# Patient Record
Sex: FEMALE | Employment: STUDENT | URBAN - METROPOLITAN AREA
[De-identification: names, ages, dates, MRNs, and addresses within clinical notes are randomized per-mention and may not be internally consistent; named-entity substitution may affect disease eponyms.]

---

## 2019-01-09 ENCOUNTER — OFFICE VISIT (OUTPATIENT)
Dept: CARDIOLOGY CLINIC | Age: 24
End: 2019-01-09

## 2019-01-09 VITALS
SYSTOLIC BLOOD PRESSURE: 120 MMHG | WEIGHT: 147 LBS | RESPIRATION RATE: 18 BRPM | HEIGHT: 68 IN | BODY MASS INDEX: 22.28 KG/M2 | HEART RATE: 65 BPM | DIASTOLIC BLOOD PRESSURE: 76 MMHG

## 2019-01-09 DIAGNOSIS — I47.1 SVT (SUPRAVENTRICULAR TACHYCARDIA) (HCC): Primary | ICD-10-CM

## 2019-01-09 RX ORDER — METOPROLOL SUCCINATE 25 MG/1
12.5 TABLET, EXTENDED RELEASE ORAL DAILY
COMMUNITY
End: 2019-01-17

## 2019-01-09 NOTE — PROGRESS NOTES
Room # 5  New Patient - SVT    Visit Vitals  /76   Pulse 65   Resp 18   Ht 5' 8\" (1.727 m)   Wt 147 lb (66.7 kg)   BMI 22.35 kg/m²     Referral from Dr. Kayleen Kc for SVT. Stated a monitor was worn a few years ago but nothing recent. Reported dizzy spells with metoprolol 12.5mg BID. Currently taking 12.5mg qd. Limits caffeine intake to restaurants.

## 2019-01-09 NOTE — PROGRESS NOTES
HISTORY OF PRESENTING ILLNESS      Josué Barry is a 26 y.o. female referred for recommendations regarding management of SVT and palpitations. Patient has had palpitations for several years however had progression to a sustained episode that occurred in November requiring emergency room evaluation. She was given adenosine at that time which terminated her tachycardia. She was informed she had a supraventricular tachycardia. Rhythm strips from that event are not available for review today. She was not on metoprolol at the time however has been taking metoprolol since then. She has experienced lightheadedness with metoprolol 25 mg daily however is tolerant of metoprolol 12.5 mg daily. She continues to express occasional palpitations. She is also been told that she has PVCs. She does experience occasional palpitations or episodes of shortness of breath that she suspects may be related to PVCs. Her episode of SVT occurred after taking pseudoephedrine. She does report significant anxiety regarding recurrence of episodes of SVT. 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 ACTIVE PROBLEM LIST     There are no active problems to display for this patient. PAST MEDICAL HISTORY     No past medical history on file. PAST SURGICAL HISTORY     No past surgical history on file. ALLERGIES     Allergies not on file       FAMILY HISTORY     No family history on file. negative for cardiac disease       SOCIAL HISTORY            MEDICATIONS     No current outpatient medications on file. No current facility-administered medications for this visit. I have reviewed the nurses notes, vitals, problem list, allergy list, medical history, family, social history and medications. REVIEW OF SYMPTOMS      General: Pt denies excessive weight gain or loss.  Pt is able to conduct ADL's  HEENT: Denies blurred vision, headaches, hearing loss, epistaxis and difficulty swallowing. Respiratory: Denies cough, congestion, shortness of breath, DELACRUZ, wheezing or stridor. Cardiovascular: Denies precordial pain, palpitations, edema or PND  Gastrointestinal: Denies poor appetite, indigestion, abdominal pain or blood in stool  Genitourinary: Denies hematuria, dysuria, increased urinary frequency  Musculoskeletal: Denies joint pain or swelling from muscles or joints  Neurologic: Denies tremor, paresthesias, headache, or sensory motor disturbance  Psychiatric: Denies confusion, insomnia, depression  Integumentray: Denies rash, itching or ulcers. Hematologic: Denies easy bruising, bleeding       PHYSICAL EXAMINATION      There were no vitals filed for this visit. General: Well developed, in no acute distress. HEENT: No jaundice, oral mucosa moist, no oral ulcers  Neck: Supple, no stiffness, no lymphadenopathy, supple  Heart:  Normal S1/S2 negative S3 or S4. Regular, no murmur, gallop or rub, no jugular venous distention  Respiratory: Clear bilaterally x 4, no wheezing or rales  Abdomen:   Soft, non-tender, bowel sounds are active.   Extremities:  No edema, normal cap refill, no cyanosis. Musculoskeletal: No clubbing, no deformities  Neuro: A&Ox3, speech clear, gait stable, cooperative, no focal neurologic deficits  Skin: Skin color is normal. No rashes or lesions. Non diaphoretic, moist.  Vascular: 2+ pulses symmetric in all extremities       DIAGNOSTIC DATA      EKG: Sinus rhythm       LABORATORY DATA    No results found for: WBC, HGBPOC, HGB, HGBP, HCTPOC, HCT, PHCT, RBCH, PLT, MCV, HGBEXT, HCTEXT, PLTEXT   No results found for: NA, K, CL, CO2, AGAP, GLU, BUN, CREA, BUCR, GFRAA, GFRNA, CA, TBIL, TBILI, GPT, SGOT, AP, TP, ALB, GLOB, AGRAT, ALT        ASSESSMENT      1. Supraventricular tachycardia       PLAN     Discussed options of continued drug therapy versus SVT ablation. Patient will consider her options and notify us of her decision.   If she proceeds with ablation she wishes to have her ablation performed potentially prior to January 22 as she has to return to college. Would hold metoprolol 5 days prior to ablation and arrange for MAC anesthesia. Post ablation would arrange for 30-day monitor to evaluate whether her PVCs are symptomatic as well as for recurrence of SVT. Should she remain on drug therapy moving forward with continue metoprolol at 12.5 mg daily and monitor for breakthrough episodes in which case we may consider changing to an antiarrhythmic drug such as Rythmol given she has had lightheadedness with higher doses of metoprolol. Would arrange for a 30-day monitor thereafter to evaluate for symptom attic PVCs. FOLLOW-UP     Post ablation or in 3 months      Thank you, Dr. Radu Milner for allowing me to participate in the care of this extraordinarily pleasant female. Please do not hesitate to contact me for further questions/concerns.          Niesha Snyder MD  Cardiac Electrophysiology / Cardiology    [address]  [address], [facility] [address] Blanca Burgos.    [place], [address]  [phone] / [phone] Fax   [phone] / [phone] Fax

## 2019-01-10 ENCOUNTER — TELEPHONE (OUTPATIENT)
Dept: CARDIOLOGY CLINIC | Age: 24
End: 2019-01-10

## 2019-01-10 NOTE — TELEPHONE ENCOUNTER
Called patient. Left voicemail message. Notes received from Dr. Braxton Child office for SVT. Notes also mentioned patient was in ER on 10/18/18. Requesting location on ER visit to acquire records.

## 2019-01-10 NOTE — TELEPHONE ENCOUNTER
Request faxed to Medical Records for [facility] in [place]. Confirmation.

## 2019-01-10 NOTE — TELEPHONE ENCOUNTER
Pt called stating she is returning a call regarding what hospital she previous went to. Hospital name: [facility] in [place], [place].   Phone # [phone]  Thanks

## 2019-01-11 ENCOUNTER — TELEPHONE (OUTPATIENT)
Dept: CARDIOLOGY CLINIC | Age: 24
End: 2019-01-11

## 2019-01-11 RX ORDER — SODIUM CHLORIDE 0.9 % (FLUSH) 0.9 %
5-40 SYRINGE (ML) INJECTION AS NEEDED
Status: CANCELLED | OUTPATIENT
Start: 2019-01-17

## 2019-01-11 RX ORDER — SODIUM CHLORIDE 0.9 % (FLUSH) 0.9 %
5-40 SYRINGE (ML) INJECTION EVERY 8 HOURS
Status: CANCELLED | OUTPATIENT
Start: 2019-01-17

## 2019-01-11 NOTE — TELEPHONE ENCOUNTER
Pt called f/u on a procedure she needs to have scheduled. She stated she has made the decision and would like a call back.   Phone #[phone]  Thanks

## 2019-01-11 NOTE — TELEPHONE ENCOUNTER
Returned call. Patient would like to move forward with SVT Ablation. Will schedule for Thursday, January 17th, 11am arrival.  Labs drawn upon arrival.  NPO after midnight. Hold metoprolol 5 days prior to procedure. Understanding expressed.

## 2019-01-16 ENCOUNTER — TELEPHONE (OUTPATIENT)
Dept: CARDIOLOGY CLINIC | Age: 24
End: 2019-01-16

## 2019-01-16 NOTE — TELEPHONE ENCOUNTER
Returned call. Per Dr. Enedina Pineda, patient may keep nail polish on prior to ablation tomorrow. Understanding expressed.

## 2019-01-16 NOTE — TELEPHONE ENCOUNTER
Pt called to discuss if she needs to remove her nail polish prior to tomorrow procedure.   Phone # [phone]  Thanks

## 2019-01-17 ENCOUNTER — ANESTHESIA EVENT (OUTPATIENT)
Dept: SURGERY | Age: 24
End: 2019-01-17
Payer: COMMERCIAL

## 2019-01-17 ENCOUNTER — ANESTHESIA (OUTPATIENT)
Dept: SURGERY | Age: 24
End: 2019-01-17
Payer: COMMERCIAL

## 2019-01-17 ENCOUNTER — HOSPITAL ENCOUNTER (OUTPATIENT)
Dept: NON INVASIVE DIAGNOSTICS | Age: 24
Discharge: HOME | End: 2019-01-17
Attending: INTERNAL MEDICINE | Admitting: INTERNAL MEDICINE
Payer: COMMERCIAL

## 2019-01-17 LAB
ANION GAP SERPL CALC-SCNC: 11 MMOL/L (ref 5–15)
BASOPHILS # BLD: 0 K/UL (ref 0–0.1)
BASOPHILS NFR BLD: 1 % (ref 0–1)
BUN SERPL-MCNC: 9 MG/DL (ref 6–20)
BUN/CREAT SERPL: 12 (ref 12–20)
CALCIUM SERPL-MCNC: 8.9 MG/DL (ref 8.5–10.1)
CHLORIDE SERPL-SCNC: 103 MMOL/L (ref 97–108)
CO2 SERPL-SCNC: 25 MMOL/L (ref 21–32)
COMMENT, HOLDF: NORMAL
CREAT SERPL-MCNC: 0.76 MG/DL (ref 0.55–1.02)
DIFFERENTIAL METHOD BLD: ABNORMAL
EOSINOPHIL # BLD: 0.2 K/UL (ref 0–0.4)
EOSINOPHIL NFR BLD: 3 % (ref 0–7)
ERYTHROCYTE [DISTWIDTH] IN BLOOD BY AUTOMATED COUNT: 14.2 % (ref 11.5–14.5)
GLUCOSE SERPL-MCNC: 86 MG/DL (ref 65–100)
HCG SERPL QL: NEGATIVE
HCT VFR BLD AUTO: 36.6 % (ref 35–47)
HGB BLD-MCNC: 11.8 G/DL (ref 11.5–16)
IMM GRANULOCYTES # BLD AUTO: 0 K/UL (ref 0–0.04)
IMM GRANULOCYTES NFR BLD AUTO: 0 % (ref 0–0.5)
INR PPP: 1 (ref 0.9–1.1)
LYMPHOCYTES # BLD: 2.1 K/UL (ref 0.8–3.5)
LYMPHOCYTES NFR BLD: 27 % (ref 12–49)
MCH RBC QN AUTO: 25.9 PG (ref 26–34)
MCHC RBC AUTO-ENTMCNC: 32.2 G/DL (ref 30–36.5)
MCV RBC AUTO: 80.4 FL (ref 80–99)
MONOCYTES # BLD: 0.5 K/UL (ref 0–1)
MONOCYTES NFR BLD: 7 % (ref 5–13)
NEUTS SEG # BLD: 4.7 K/UL (ref 1.8–8)
NEUTS SEG NFR BLD: 62 % (ref 32–75)
NRBC # BLD: 0 K/UL (ref 0–0.01)
NRBC BLD-RTO: 0 PER 100 WBC
PLATELET # BLD AUTO: 252 K/UL (ref 150–400)
PMV BLD AUTO: 10 FL (ref 8.9–12.9)
POTASSIUM SERPL-SCNC: 3.8 MMOL/L (ref 3.5–5.1)
PROTHROMBIN TIME: 10.5 SEC (ref 9–11.1)
RBC # BLD AUTO: 4.55 M/UL (ref 3.8–5.2)
SAMPLES BEING HELD,HOLD: NORMAL
SODIUM SERPL-SCNC: 139 MMOL/L (ref 136–145)
WBC # BLD AUTO: 7.5 K/UL (ref 3.6–11)

## 2019-01-17 PROCEDURE — 77030029065 HC DRSG HEMO QCLOT ZMED -B

## 2019-01-17 PROCEDURE — 84703 CHORIONIC GONADOTROPIN ASSAY: CPT

## 2019-01-17 PROCEDURE — 85025 COMPLETE CBC W/AUTO DIFF WBC: CPT

## 2019-01-17 PROCEDURE — 77030018729 HC ELECTRD DEFIB PAD CARD -B

## 2019-01-17 PROCEDURE — 74011250636 HC RX REV CODE- 250/636

## 2019-01-17 PROCEDURE — 76060000033 HC ANESTHESIA 1 TO 1.5 HR

## 2019-01-17 PROCEDURE — C1894 INTRO/SHEATH, NON-LASER: HCPCS

## 2019-01-17 PROCEDURE — 93621 COMP EP EVL L PAC&REC C SINS: CPT

## 2019-01-17 PROCEDURE — C1893 INTRO/SHEATH, FIXED,NON-PEEL: HCPCS

## 2019-01-17 PROCEDURE — C1733 CATH, EP, OTHR THAN COOL-TIP: HCPCS

## 2019-01-17 PROCEDURE — 93653 COMPRE EP EVAL TX SVT: CPT

## 2019-01-17 PROCEDURE — 74011250636 HC RX REV CODE- 250/636: Performed by: INTERNAL MEDICINE

## 2019-01-17 PROCEDURE — 77030027107 HC PTCH EXT REF CARTO3 J&J -F

## 2019-01-17 PROCEDURE — 85610 PROTHROMBIN TIME: CPT

## 2019-01-17 PROCEDURE — 74011000250 HC RX REV CODE- 250: Performed by: INTERNAL MEDICINE

## 2019-01-17 PROCEDURE — 80048 BASIC METABOLIC PNL TOTAL CA: CPT

## 2019-01-17 PROCEDURE — 74011000258 HC RX REV CODE- 258: Performed by: INTERNAL MEDICINE

## 2019-01-17 PROCEDURE — C1732 CATH, EP, DIAG/ABL, 3D/VECT: HCPCS

## 2019-01-17 PROCEDURE — C1730 CATH, EP, 19 OR FEW ELECT: HCPCS

## 2019-01-17 PROCEDURE — 36415 COLL VENOUS BLD VENIPUNCTURE: CPT

## 2019-01-17 RX ORDER — HEPARIN SODIUM 1000 [USP'U]/ML
1000-10000 INJECTION, SOLUTION INTRAVENOUS; SUBCUTANEOUS
Status: DISCONTINUED | OUTPATIENT
Start: 2019-01-17 | End: 2019-01-17 | Stop reason: HOSPADM

## 2019-01-17 RX ORDER — ACETAMINOPHEN 325 MG/1
650 TABLET ORAL
Status: DISCONTINUED | OUTPATIENT
Start: 2019-01-17 | End: 2019-01-17 | Stop reason: HOSPADM

## 2019-01-17 RX ORDER — SODIUM CHLORIDE 0.9 % (FLUSH) 0.9 %
5-40 SYRINGE (ML) INJECTION EVERY 8 HOURS
Status: DISCONTINUED | OUTPATIENT
Start: 2019-01-17 | End: 2019-01-17 | Stop reason: HOSPADM

## 2019-01-17 RX ORDER — HEPARIN SODIUM 200 [USP'U]/100ML
500 INJECTION, SOLUTION INTRAVENOUS ONCE
Status: COMPLETED | OUTPATIENT
Start: 2019-01-17 | End: 2019-01-17

## 2019-01-17 RX ORDER — ONDANSETRON 2 MG/ML
4 INJECTION INTRAMUSCULAR; INTRAVENOUS
Status: DISCONTINUED | OUTPATIENT
Start: 2019-01-17 | End: 2019-01-17 | Stop reason: HOSPADM

## 2019-01-17 RX ORDER — SODIUM CHLORIDE 0.9 % (FLUSH) 0.9 %
5-40 SYRINGE (ML) INJECTION AS NEEDED
Status: DISCONTINUED | OUTPATIENT
Start: 2019-01-17 | End: 2019-01-17 | Stop reason: HOSPADM

## 2019-01-17 RX ORDER — ATROPINE SULFATE 0.1 MG/ML
0.5 INJECTION INTRAVENOUS
Status: DISCONTINUED | OUTPATIENT
Start: 2019-01-17 | End: 2019-01-17 | Stop reason: HOSPADM

## 2019-01-17 RX ORDER — HEPARIN SODIUM 200 [USP'U]/100ML
500 INJECTION, SOLUTION INTRAVENOUS ONCE
Status: DISCONTINUED | OUTPATIENT
Start: 2019-01-17 | End: 2019-01-17 | Stop reason: HOSPADM

## 2019-01-17 RX ORDER — HYDROCODONE BITARTRATE AND ACETAMINOPHEN 5; 325 MG/1; MG/1
1 TABLET ORAL
Status: DISCONTINUED | OUTPATIENT
Start: 2019-01-17 | End: 2019-01-17 | Stop reason: HOSPADM

## 2019-01-17 RX ORDER — PROPOFOL 10 MG/ML
INJECTION, EMULSION INTRAVENOUS
Status: DISCONTINUED | OUTPATIENT
Start: 2019-01-17 | End: 2019-01-18 | Stop reason: HOSPADM

## 2019-01-17 RX ORDER — SODIUM CHLORIDE 9 MG/ML
INJECTION, SOLUTION INTRAVENOUS
Status: DISCONTINUED | OUTPATIENT
Start: 2019-01-17 | End: 2019-01-18 | Stop reason: HOSPADM

## 2019-01-17 RX ORDER — LIDOCAINE HYDROCHLORIDE 10 MG/ML
10-30 INJECTION INFILTRATION; PERINEURAL
Status: DISCONTINUED | OUTPATIENT
Start: 2019-01-17 | End: 2019-01-17 | Stop reason: HOSPADM

## 2019-01-17 RX ORDER — PROPOFOL 10 MG/ML
INJECTION, EMULSION INTRAVENOUS AS NEEDED
Status: DISCONTINUED | OUTPATIENT
Start: 2019-01-17 | End: 2019-01-18 | Stop reason: HOSPADM

## 2019-01-17 RX ADMIN — PROPOFOL 80 MG: 10 INJECTION, EMULSION INTRAVENOUS at 15:19

## 2019-01-17 RX ADMIN — PROPOFOL 60 MG: 10 INJECTION, EMULSION INTRAVENOUS at 15:36

## 2019-01-17 RX ADMIN — PROPOFOL 40 MG: 10 INJECTION, EMULSION INTRAVENOUS at 15:28

## 2019-01-17 RX ADMIN — HEPARIN SODIUM 1000 UNITS: 200 INJECTION, SOLUTION INTRAVENOUS at 15:32

## 2019-01-17 RX ADMIN — PROPOFOL 40 MG: 10 INJECTION, EMULSION INTRAVENOUS at 15:25

## 2019-01-17 RX ADMIN — PROPOFOL 40 MG: 10 INJECTION, EMULSION INTRAVENOUS at 15:23

## 2019-01-17 RX ADMIN — ISOPROTERENOL HYDROCHLORIDE 2 MCG/MIN: 0.2 INJECTION, SOLUTION INTRAMUSCULAR; INTRAVENOUS at 16:05

## 2019-01-17 RX ADMIN — SODIUM CHLORIDE: 9 INJECTION, SOLUTION INTRAVENOUS at 15:05

## 2019-01-17 RX ADMIN — PROPOFOL 40 MG: 10 INJECTION, EMULSION INTRAVENOUS at 15:21

## 2019-01-17 RX ADMIN — LIDOCAINE HYDROCHLORIDE 20 ML: 10 INJECTION, SOLUTION INFILTRATION; PERINEURAL at 15:32

## 2019-01-17 RX ADMIN — PROPOFOL 100 MCG/KG/MIN: 10 INJECTION, EMULSION INTRAVENOUS at 15:27

## 2019-01-17 RX ADMIN — SODIUM CHLORIDE: 9 INJECTION, SOLUTION INTRAVENOUS at 16:25

## 2019-01-17 RX ADMIN — PROPOFOL 40 MG: 10 INJECTION, EMULSION INTRAVENOUS at 15:55

## 2019-01-17 NOTE — ANESTHESIA PREPROCEDURE EVALUATION
Anesthetic History No history of anesthetic complications Review of Systems / Medical History Patient summary reviewed, nursing notes reviewed and pertinent labs reviewed Pulmonary Within defined limits Neuro/Psych Within defined limits Cardiovascular Dysrhythmias : SVT and PVC Comments: Chronic palpitations, h/o SVT  
GI/Hepatic/Renal 
Within defined limits Endo/Other Within defined limits Other Findings Physical Exam 
 
Airway Mallampati: II 
 
Neck ROM: normal range of motion Cardiovascular Rhythm: regular Rate: normal 
 
 
 
 Dental 
No notable dental hx Pulmonary Breath sounds clear to auscultation Abdominal 
GI exam deferred Other Findings Anesthetic Plan ASA: 2 Anesthesia type: MAC Induction: Intravenous Anesthetic plan and risks discussed with: Patient

## 2019-01-17 NOTE — PROGRESS NOTES
Patient arrived. ID and allergies verified verbally with patient. Pt voices understanding of procedure to be performed. Consent obtained. Pt prepped for procedure. Labs sent. 16:30  TRANSFER - IN REPORT: 
 
Verbal report received from RUBEN Flores(name) on Caresse Carter  being received from EP(unit) for routine progression of care Report consisted of patients Situation, Background, Assessment and  
Recommendations(SBAR). Information from the following report(s) Procedure Summary was reviewed with the receiving nurse. Opportunity for questions and clarification was provided. Assessment completed upon patients arrival to unit and care assumed. 19:30,Discharge instructions reviewed with patient and family. Voiced understanding. Patient given copy of discharge instructions to take home. Pt taken out of unit in [facility] warm,pale and nauseated,pt brought back to unit,placed on stretcher,BP 92/71. After heaving,pt drank some water and state1+lku d she felt better. BP is 118/79. Rt groin stable. 19:50,pt DC via [facility] home with Mother.

## 2019-01-17 NOTE — DISCHARGE INSTRUCTIONS
Electrophysiology Study (EPS)/Cardiac Ablation   Discharge Instructions      You have just had a Cardiac Ablation for supraventricular tachycardia (specifically \"AVNRT\" - atrioventricular thee reentrant tachycardia). There were catheters temporarily placed in your heart through a puncture in the Veins and/or Arteries in your groin. WHAT TO EXPECT      If you have had a Cardiac Ablation please be aware that you may experience mild chest pain that will resolve within 24 hours. If the Chest Pain begins radiating down your shoulders or arms, becomes severe or breathing becomes difficult, call 911 or go to the closest emergency room.  Mild to moderate, non-painful, bruising at the puncture site is not un-common, and will resolve in 7 - 10 days.  If a closure device was used to seal your artery or vein, a separate pamphlet will be given to you with these discharge instructions. It is very important that you review the information in the pamphlet for different restrictions and precautions.  You have a small gauze dressing applied to the puncture site in your groin. You may remove this the following morning. MEDICATIONS      Take only the medications prescribed to you at discharge. ACTIVITY      A responsible adult must take you home. Do not drive a car for 24 hours.  Rest quietly for the remainder of the day.  Do not lift anything greater than 10 pounds for 3 days.  Limit bending at the puncture site and use of stairs for at least 3 days.  You may remove the bandage and shower the morning after the procedure. Do not take a bath for 3 days. SYMPTOMS THAT NEED TO BE REPORTED IMMEDIATELY      Bleeding at the puncture site. If there is bleeding, lie down and hold firm direct pressure for at least 5 minutes. If the bleeding does not stop, go to the closest emergency room, or call 911.      Temperature more than 100.5 F.   Redness or warmth at the puncture site.   Increasing pain, numbness, coolness or blue discoloration of the extremity where the puncture is located.  Pulsating mass at the puncture site.  A new lump at the puncture site, or increasing swelling at the site.  Bruising at the puncture site that enlarges or becomes painful (some bruising at the site is common and will go away in 7 - 10 days).  Rapid heart rate or palpitations.  Dizziness, lightheadedness, fainting.  REMEMBER: If you feel something is an emergency or cannot be handled over the phone, call 911 or go to the closest emergency room.       Edmond Dowdy Dr. Antonette Gottron in 1 month        Sandra Carrington MD  Cardiac Electrophysiology / Cardiology    [address]  [address] [place] [address]  [phone] / [phone] Fax   [phone] / [phone] Fax

## 2019-01-17 NOTE — H&P
HISTORY OF PRESENTING ILLNESS Timmy Morales is a 26 y.o. female referred for recommendations regarding management of SVT and palpitations. Patient has had palpitations for several years however had progression to a sustained episode that occurred in November requiring emergency room evaluation. She was given adenosine at that time which terminated her tachycardia. She was informed she had a supraventricular tachycardia. Rhythm strips from that event are not available for review today. She was not on metoprolol at the time however has been taking metoprolol since then. She has experienced lightheadedness with metoprolol 25 mg daily however is tolerant of metoprolol 12.5 mg daily. She continues to express occasional palpitations. She is also been told that she has PVCs. She does experience occasional palpitations or episodes of shortness of breath that she suspects may be related to PVCs. Her episode of SVT occurred after taking pseudoephedrine. She does report significant anxiety regarding recurrence of episodes of SVT. 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 
  
  
 ACTIVE PROBLEM LIST  
  
There are no active problems to display for this patient. 
  
  
  
 PAST MEDICAL HISTORY  
  
No past medical history on file.  
  
  
 PAST SURGICAL HISTORY  
  
No past surgical history on file. 
  
  
 ALLERGIES  
  
Allergies not on file  
  
  
FAMILY HISTORY  
  
No family history on file. negative for cardiac disease 
  
  
 SOCIAL HISTORY  
  
  
  
  
  
MEDICATIONS  
  
No current outpatient medications on file.  
  
No current facility-administered medications for this visit.   
  
  
I have reviewed the nurses notes, vitals, problem list, allergy list, medical history, family, social history and medications. 
  
  
 REVIEW OF SYMPTOMS General: Pt denies excessive weight gain or loss.  Pt is able to conduct ADL's 
 HEENT: Denies blurred vision, headaches, hearing loss, epistaxis and difficulty swallowing. Respiratory: Denies cough, congestion, shortness of breath, DELACRUZ, wheezing or stridor. Cardiovascular: Denies precordial pain, palpitations, edema or PND Gastrointestinal: Denies poor appetite, indigestion, abdominal pain or blood in stool Genitourinary: Denies hematuria, dysuria, increased urinary frequency Musculoskeletal: Denies joint pain or swelling from muscles or joints Neurologic: Denies tremor, paresthesias, headache, or sensory motor disturbance Psychiatric: Denies confusion, insomnia, depression Integumentray: Denies rash, itching or ulcers. Hematologic: Denies easy bruising, bleeding 
  
  
 PHYSICAL EXAMINATION There were no vitals filed for this visit. General: Well developed, in no acute distress. HEENT: No jaundice, oral mucosa moist, no oral ulcers Neck: Supple, no stiffness, no lymphadenopathy, supple Heart:  Normal S1/S2 negative S3 or S4. Regular, no murmur, gallop or rub, no jugular venous distention Respiratory: Clear bilaterally x 4, no wheezing or rales Abdomen:   Soft, non-tender, bowel sounds are active.  
Extremities:  No edema, normal cap refill, no cyanosis. Musculoskeletal: No clubbing, no deformities Neuro: A&Ox3, speech clear, gait stable, cooperative, no focal neurologic deficits Skin: Skin color is normal. No rashes or lesions. Non diaphoretic, moist. 
Vascular: 2+ pulses symmetric in all extremities 
  
  
 DIAGNOSTIC DATA EKG: Sinus rhythm 
  
  
 LABORATORY DATA No results found for: WBC, HGBPOC, HGB, HGBP, HCTPOC, HCT, PHCT, RBCH, PLT, MCV, HGBEXT, HCTEXT, PLTEXT No results found for: NA, K, CL, CO2, AGAP, GLU, BUN, CREA, BUCR, GFRAA, GFRNA, CA, TBIL, TBILI, GPT, SGOT, AP, TP, ALB, GLOB, AGRAT, ALT  
  
  
 ASSESSMENT 1. Supraventricular tachycardia 
  
  
 PLAN  
  
SVT ablation.  Post ablation will arrange for 30-day monitor to evaluate whether her PVCs are symptomatic as well as for recurrence of SVT.   
  
  
Bharti Mullen MD 
Cardiac Electrophysiology / Cardiology 
  
[address] [facility] [address] [facility], [address] 
[phone] / [phone] Fax                                    [phone] / [phone] Fax

## 2019-01-17 NOTE — PROCEDURES
Cardiac Electrophysiology Report      PATIENT INFORMATION      Patient Name: Олег Stone MRN: [id number]                             Study Date: 2019    YOB: 1995   Age: 26 y.o. Gender: female      Procedure:  Electrophysiology Study +/- Cardiac Ablation    Referring Physician:  Dion Pena MD          STAFF     Duty Name   Electrophysiologist Mayela Manuel MD   Monitor Anesthesia service   Circulator Brady Oneil RN; Braulio Peterson RN; Cl Vo RN       PATIENT HISTORY     Tiara Morris a 23 y.o. female with SVT and palpitations. Patient has had palpitations for several years however had progression to a sustained episode that occurred in November requiring emergency room evaluation.  She was given adenosine at that time which terminated her tachycardia.  She was informed she had a supraventricular tachycardia. She was not on metoprolol at the time however has been taking metoprolol since then. Constanza Hackett has experienced lightheadedness with metoprolol 25 mg daily however was tolerant of metoprolol 12.5 mg daily.  She continues to express occasional palpitations. Constanza Hackett is also had been told that she had PVCs.  She experienced occasional palpitations or episodes of shortness of breath that she suspected may be related to PVCs.  Her episode of SVT occurred after taking pseudoephedrine.  She does report significant anxiety regarding recurrence of episodes of SVT. She now presents for SVT ablation. PROCEDURE     The patient was brought to the Cardiac Electrophysiology laboratory in a post-absorptive, fasting state. Informed consent was obtained. A peripheral IV was in place. Continuous electrocardiographic, blood pressure, O2 saturation and  CO2 monitoring was initiated. Self-adhesive cardioversion patches were positioned on the chest.  Conscious sedation was administered per protocol.   The patient was then prepped and draped in the usual sterile fashion. Both groins were infiltrated with a 50/50 mixture of Lidocaine (1%) and bupivicaine (0.5%). An 8F,  7F and two 6F sheaths were placed in the right femoral vein. Through these sheaths, 6F quadrapolar catheters were placed in the high right atrium, right ventricle and His positions, and a 7F octapolar catheter was placed in the coronary sinus for left atrial pacing/recording. Programmed and incremental stimulation was performed from the RA, RV apex and RV outflow tract at twice the diastolic threshold. Once the tachycardia was induced, various maneuvers were performed to determine its mechanism. These included atrial extrastimuli, ventricular extrastimuli, atrial burst pacing, ventricular burst pacing. Studies were performed at baseline and on cardio-active drugs. The chambers of interest were mapped to identify the abnormal tissue. A 7F, 4mm ablation catheter was positioned adjacent to the abnormal tissue and RF energy delivered until the abnormal tissue was eliminated. At the conclusion of the procedure, the catheters and sheaths were removed and hemostasis obtained by direct compression. The patient was hemodynamically stable, tolerated the procedure well and was transferred in stable condition. There was minimal blood loss and no specimen were removed. CONDUCTION INTERVALS     V-V  673 P-R  140 QRS  82 Q-T  382 A-H  83 H-V  40         FINDINGS     1. The baseline ECG revealed sinus rhythm without ventricular preexcitation with frequent paroxysms of narrow complex tachycardia with a  msec. 2. The intracardiac intervals were normal (HV= 40 msec). 3. AV thee function curves were attempted however tachycardia induced. 4. Retrograde conduction was concentric and decremental.   5. The narrow complex, short RP tachycardia  (VA =  10 ms) had a TCL of 380 ms. Ventricular overdrive pacing was performed and upon cessation a VAV response was exhibited.  The PPI-TCL was >115 ms the VAtach-VAstim was > 85 ms. 6. 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 7. A 4 mm, bi-directional, non-irrigated tip ablation catheter was then advanced through an SR0 sheath and positioned anterior and inferior to the coronary sinus ostium. Once adequate electrograms were identified, several applications of radiofrequency energy were delivered with resultant junctional beats evident during ablation. 8. Delivery of RF energy rendered the tachycardia no longer inducible on/off isoproteronol. RADIOLOGY SUMMARY      Total   Fluoro Time (minutes) 10.9   Dose Area Product (mGy) 122       CONCLUSIONS     1. 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 Discontinue metoprolol  3. Plan for 30 day monitor to determine whether patient is also symptomatic from PVCs. 4. Follow up in 1 month in EP clinic. 5. Follow up with Dony Cosme MD as scheduled. Thank you, Dony Cosme MD for allowing me to participate in the care of this extraordinarily pleasant female.          Maggie Calderón MD  Cardiac Electrophysiology / Cardiology    [place] [address].  [address] [facility], [address] [address], [address]  [address]  [phone] / [phone] Fax    [phone] / [phone] Fax

## 2019-01-17 NOTE — PROGRESS NOTES
1500 TRANSFER - OUT REPORT: 
 
Verbal report given to abel(name) on Alex Carter  being transferred to  (unit) for routine progression of care Report consisted of patients Situation, Background, Assessment and  
Recommendations(SBAR). Information from the following report(s) Procedure Summary was reviewed with the receiving nurse. Lines:  
Peripheral IV 01/17/19 Right Antecubital (Active) Peripheral IV 01/17/19 Anterior;Distal;Left Forearm (Active) Opportunity for questions and clarification was provided. Patient transported with: 
 Registered Nurse

## 2019-01-18 ENCOUNTER — CLINICAL SUPPORT (OUTPATIENT)
Dept: CARDIOLOGY CLINIC | Age: 24
End: 2019-01-18

## 2019-01-18 VITALS
RESPIRATION RATE: 17 BRPM | BODY MASS INDEX: 22.12 KG/M2 | SYSTOLIC BLOOD PRESSURE: 117 MMHG | HEIGHT: 68 IN | HEART RATE: 81 BPM | TEMPERATURE: 98.1 F | WEIGHT: 145.94 LBS | DIASTOLIC BLOOD PRESSURE: 77 MMHG | OXYGEN SATURATION: 97 %

## 2019-01-18 DIAGNOSIS — I47.1 AVNRT (AV NODAL RE-ENTRY TACHYCARDIA) (HCC): Primary | ICD-10-CM

## 2019-01-18 DIAGNOSIS — I49.3 PVC (PREMATURE VENTRICULAR CONTRACTION): ICD-10-CM

## 2019-01-19 NOTE — ANESTHESIA POSTPROCEDURE EVALUATION
* No procedures listed *. Anesthesia Post Evaluation Comments: Patient left PACU before being reviewed by anestehsiologist. 
 
 
 
Visit Vitals /77 Pulse 81 Temp 36.7 °C (98.1 °F) Resp 17 Ht 5' 8\" (1.727 m) Wt 66.2 kg (145 lb 15.1 oz) SpO2 97% Breastfeeding? No  
BMI 22.19 kg/m²

## 2019-01-29 ENCOUNTER — TELEPHONE (OUTPATIENT)
Dept: CARDIOLOGY CLINIC | Age: 24
End: 2019-01-29

## 2019-01-29 NOTE — TELEPHONE ENCOUNTER
Pt called to f/u on the 30 day heart monitor. She asked what does she have to do as far as receiving it.   Phone #[phone]  Thanks

## 2019-01-31 NOTE — TELEPHONE ENCOUNTER
L/m to inform her that the [employer] tracking # shows loop was delivered on 1/24 3:26 front door. Gave pt ph# for [employer].

## 2019-03-08 ENCOUNTER — TELEPHONE (OUTPATIENT)
Dept: CARDIOLOGY CLINIC | Age: 24
End: 2019-03-08

## 2019-03-08 NOTE — TELEPHONE ENCOUNTER
Patient states she just finished her 30 days with heart monitor and is wondering if she needs to schedule a follow up or is the 4/15/19 appointment sufficient.        Phone: [phone]

## 2019-03-29 ENCOUNTER — OFFICE VISIT (OUTPATIENT)
Dept: CARDIOLOGY CLINIC | Age: 24
End: 2019-03-29

## 2019-03-29 VITALS
OXYGEN SATURATION: 98 % | WEIGHT: 152.8 LBS | HEART RATE: 76 BPM | DIASTOLIC BLOOD PRESSURE: 62 MMHG | RESPIRATION RATE: 14 BRPM | SYSTOLIC BLOOD PRESSURE: 104 MMHG | BODY MASS INDEX: 23.16 KG/M2 | HEIGHT: 68 IN

## 2019-03-29 DIAGNOSIS — I47.1 AVNRT (AV NODAL RE-ENTRY TACHYCARDIA) (HCC): Primary | ICD-10-CM

## 2019-03-29 RX ORDER — DROSPIRENONE AND ETHINYL ESTRADIOL 0.03MG-3MG
KIT ORAL DAILY
COMMUNITY

## 2019-03-29 NOTE — PROGRESS NOTES
HISTORY OF PRESENTING ILLNESS      Sarah Ray is a 26 y.o. 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 Post ablation metoprolol was discontinued and a 30-day monitor was arranged to determine whether patient was also symptomatic from PVCs. She now presents for follow-up. 30 monitor demonstrated PVCs during which patient reported palpitations. She was also noticed to have several episodes of long RP tachycardias with heart rates into the 150s-160s however these were asymptomatic. Patient reports that she is no longer experiencing episodes consistent with her previous clinical symptoms. Her PVCs are symptomatic though are not bothersome and are not impeding her quality of life at this time. ACTIVE PROBLEM LIST     There are no active problems to display for this patient. PAST MEDICAL HISTORY     No past medical history on file. PAST SURGICAL HISTORY     No past surgical history on file. ALLERGIES     Allergies   Allergen Reactions    Sulfa (Sulfonamide Antibiotics) Itching          FAMILY HISTORY     No family history on file. negative for cardiac disease       SOCIAL HISTORY     Social History     Socioeconomic History    Marital status: SINGLE     Spouse name: Not on file    Number of children: Not on file    Years of education: Not on file    Highest education level: Not on file   Tobacco Use    Smoking status: Never Smoker    Smokeless tobacco: Never Used         MEDICATIONS     Current Outpatient Medications   Medication Sig    OTHER      No current facility-administered medications for this visit. I have reviewed the nurses notes, vitals, problem list, allergy list, medical history, family, social history and medications. REVIEW OF SYMPTOMS      General: Pt denies excessive weight gain or loss.  Pt is able to conduct ADL's  HEENT: Denies blurred vision, headaches, hearing loss, epistaxis and difficulty swallowing. Respiratory: Denies cough, congestion, shortness of breath, DELACRUZ, wheezing or stridor. Cardiovascular: Denies precordial pain, palpitations, edema or PND  Gastrointestinal: Denies poor appetite, indigestion, abdominal pain or blood in stool  Genitourinary: Denies hematuria, dysuria, increased urinary frequency  Musculoskeletal: Denies joint pain or swelling from muscles or joints  Neurologic: Denies tremor, paresthesias, headache, or sensory motor disturbance  Psychiatric: Denies confusion, insomnia, depression  Integumentray: Denies rash, itching or ulcers. Hematologic: Denies easy bruising, bleeding       PHYSICAL EXAMINATION      There were no vitals filed for this visit. General: Well developed, in no acute distress. HEENT: No jaundice, oral mucosa moist, no oral ulcers  Neck: Supple, no stiffness, no lymphadenopathy, supple  Heart:  Normal S1/S2 negative S3 or S4. Regular, no murmur, gallop or rub, no jugular venous distention  Respiratory: Clear bilaterally x 4, no wheezing or rales  Abdomen:   Soft, non-tender, bowel sounds are active.   Extremities:  No edema, normal cap refill, no cyanosis. Musculoskeletal: No clubbing, no deformities  Neuro: A&Ox3, speech clear, gait stable, cooperative, no focal neurologic deficits  Skin: Skin color is normal. No rashes or lesions.  Non diaphoretic, moist.  Vascular: 2+ pulses symmetric in all extremities       DIAGNOSTIC DATA      EKG:        LABORATORY DATA      Lab Results   Component Value Date/Time    WBC 7.5 01/17/2019 01:26 PM    HGB 11.8 01/17/2019 01:26 PM    HCT 36.6 01/17/2019 01:26 PM    PLATELET 764 98/12/3000 01:26 PM    MCV 80.4 01/17/2019 01:26 PM      Lab Results   Component Value Date/Time    Sodium 139 01/17/2019 01:26 PM    Potassium 3.8 01/17/2019 01:26 PM    Chloride 103 01/17/2019 01:26 PM    CO2 25 01/17/2019 01:26 PM    Anion gap 11 01/17/2019 01:26 PM Glucose 86 01/17/2019 01:26 PM    BUN 9 01/17/2019 01:26 PM    Creatinine 0.76 01/17/2019 01:26 PM    BUN/Creatinine ratio 12 01/17/2019 01:26 PM    GFR est AA >60 01/17/2019 01:26 PM    GFR est non-AA >60 01/17/2019 01:26 PM    Calcium 8.9 01/17/2019 01:26 PM           ASSESSMENT      1. Supraventricular tachycardia   A. Typical AVNRT ablation 1/17/2019  2. Premature ventricular contractions         PLAN     Continue monitoring for progression of PVCs as well as onset of symptoms with her long RP tachycardia to guide future recommendations. FOLLOW-UP     1 year      Thank you, Brennan Hernandez MD for allowing me to participate in the care of this extraordinarily pleasant female. Please do not hesitate to contact me for further questions/concerns.          Esther Ashford MD  Cardiac Electrophysiology / Cardiology    [address] [place] [address]  [phone] / [phone] Fax   [phone] / [phone] Fax

## 2019-03-29 NOTE — PROGRESS NOTES
Room # 7    Palpitations but less than before, One episode of elevated HR of 108 felt a little SOB and uncomfortable feeling in her chest     Visit Vitals  /62 (BP 1 Location: Left arm, BP Patient Position: Sitting)   Pulse 76   Resp 14   Ht 5' 8\" (1.727 m)   Wt 152 lb 12.8 oz (69.3 kg)   SpO2 98%   BMI 23.23 kg/m²

## 2020-04-09 ENCOUNTER — TELEPHONE (OUTPATIENT)
Dept: CARDIOLOGY CLINIC | Age: 25
End: 2020-04-09

## 2020-04-15 ENCOUNTER — TELEPHONE (OUTPATIENT)
Dept: CARDIOLOGY CLINIC | Age: 25
End: 2020-04-15

## 2020-04-17 ENCOUNTER — VIRTUAL VISIT (OUTPATIENT)
Dept: CARDIOLOGY CLINIC | Age: 25
End: 2020-04-17

## 2020-04-17 ENCOUNTER — TELEPHONE (OUTPATIENT)
Dept: CARDIOLOGY CLINIC | Age: 25
End: 2020-04-17

## 2020-04-17 DIAGNOSIS — I47.1 SVT (SUPRAVENTRICULAR TACHYCARDIA) (HCC): ICD-10-CM

## 2020-04-17 DIAGNOSIS — Z98.890 S/P ABLATION OF ACCESSORY BYPASS TRACT: ICD-10-CM

## 2020-04-17 DIAGNOSIS — I49.3 PVC (PREMATURE VENTRICULAR CONTRACTION): ICD-10-CM

## 2020-04-17 DIAGNOSIS — I47.1 AVNRT (AV NODAL RE-ENTRY TACHYCARDIA) (HCC): Primary | ICD-10-CM

## 2020-04-17 RX ORDER — SERTRALINE HYDROCHLORIDE 100 MG/1
100 TABLET, FILM COATED ORAL DAILY
COMMUNITY

## 2020-04-17 NOTE — PROGRESS NOTES
VIRTUAL VISIT DOCUMENTATION     Pursuant to the emergency declaration under the [facility] waiver authority and the [employer] Act, this Virtual  Visit was conducted, with patient's consent, to reduce the patient's risk of exposure to COVID-19 and provide continuity of care for an established patient. Services were provided through a video synchronous discussion virtually to substitute for in-person clinic visit. HISTORY OF PRESENTING ILLNESS      Theo Gary is a 27 y.o. female who was seen by synchronous (real-time) audio-video technology on 4/17/2020. 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 Post ablation metoprolol was discontinued and a 30-day monitor which demonstrated PVCs during which patient reported palpitations. She was also noticed to have several episodes of long RP tachycardias with heart rates into the 150s-160s however these were asymptomatic. Patient reports that she is no longer experiencing episodes consistent with her previous clinical symptoms. She is doing well and continues to have occasional PVCs, most notably around the time of her menstrual cycle. These palpitations are noticable, but not impeding quality of life. She reports adequate hydration, ability to exercise without issue. ACTIVE PROBLEM LIST     There are no active problems to display for this patient. PAST MEDICAL HISTORY     No past medical history on file. PAST SURGICAL HISTORY     Past Surgical History:   Procedure Laterality Date    HX SVT ABLATION  01/17/2019          ALLERGIES     Allergies   Allergen Reactions    Sulfa (Sulfonamide Antibiotics) Itching          FAMILY HISTORY     No family history on file.  negative for cardiac disease       SOCIAL HISTORY     Social History     Socioeconomic History    Marital status: SINGLE     Spouse name: Not on file    Number of children: Not on file    Years of education: Not on file    Highest education level: Not on file   Tobacco Use    Smoking status: Never Smoker    Smokeless tobacco: Never Used   Substance and Sexual Activity    Alcohol use: Yes     Alcohol/week: 1.0 standard drinks     Types: 1 Glasses of wine per week         MEDICATIONS     Current Outpatient Medications   Medication Sig    sertraline (ZOLOFT) 50 mg tablet Take  by mouth daily.  drospirenone-ethinyl estradiol (JUNIOR) 3-0.03 mg tab Take  by mouth daily. No current facility-administered medications for this visit. I have reviewed the nurses notes, vitals, problem list, allergy list, medical history, family, social history and medications. REVIEW OF SYMPTOMS      General: Pt denies excessive weight gain or loss. Pt is able to conduct ADL's  HEENT: Denies blurred vision, headaches, hearing loss, epistaxis and difficulty swallowing. Respiratory: Denies cough, congestion, shortness of breath, DELARCUZ, wheezing or stridor. Cardiovascular: Denies precordial pain, palpitations, edema or PND  Gastrointestinal: Denies poor appetite, indigestion, abdominal pain or blood in stool  Genitourinary: Denies hematuria, dysuria, increased urinary frequency  Musculoskeletal: Denies joint pain or swelling from muscles or joints  Neurologic: Denies tremor, paresthesias, headache, or sensory motor disturbance  Psychiatric: Denies confusion, insomnia, depression  Integumentray: Denies rash, itching or ulcers. Hematologic: Denies easy bruising, bleeding       PHYSICAL EXAMINATION      Due to this being a TeleHealth evaluation, many elements of the physical examination are unable to be assessed. General: Well developed, in no acute distress, cooperative and alert  HEENT: Pupils equal/round. No marked JVD visible on video.   Respiratory: No audible wheezing, no signs of respiratory distress, lips non cyanotic  Extremities:  No edema  Neuro: A&Ox3, speech clear, no facial droop, answering questions appropriately  Skin: Skin color is normal. No rashes or lesions. Non diaphoretic on visible skin during exam       DIAGNOSTIC DATA      EKG:        LABORATORY DATA      Lab Results   Component Value Date/Time    WBC 7.5 01/17/2019 01:26 PM    HGB 11.8 01/17/2019 01:26 PM    HCT 36.6 01/17/2019 01:26 PM    PLATELET 453 19/13/3379 01:26 PM    MCV 80.4 01/17/2019 01:26 PM      Lab Results   Component Value Date/Time    Sodium 139 01/17/2019 01:26 PM    Potassium 3.8 01/17/2019 01:26 PM    Chloride 103 01/17/2019 01:26 PM    CO2 25 01/17/2019 01:26 PM    Anion gap 11 01/17/2019 01:26 PM    Glucose 86 01/17/2019 01:26 PM    BUN 9 01/17/2019 01:26 PM    Creatinine 0.76 01/17/2019 01:26 PM    BUN/Creatinine ratio 12 01/17/2019 01:26 PM    GFR est AA >60 01/17/2019 01:26 PM    GFR est non-AA >60 01/17/2019 01:26 PM    Calcium 8.9 01/17/2019 01:26 PM           ASSESSMENT      1. Supraventricular tachycardia              A.  Typical AVNRT ablation 1/17/2019  2. Premature ventricular contractions           ICD-10-CM ICD-9-CM    1. AVNRT (AV thee re-entry tachycardia) ([facility]) I47.1 427.89    2. PVC (premature ventricular contraction) I49.3 427.69    3. SVT (supraventricular tachycardia) ([facility]) I47.1 427.89    4. S/P ablation of accessory bypass tract Z98.890 V45.89      No orders of the defined types were placed in this encounter. PLAN   Continue to monitor for progression of symptoms in which case would consider repeat monitoring to evaluate PVC burden and presence of tachycardia. We discussed the expected course, resolution and complications of the diagnosis(es) in detail. Medication risks, benefits, costs, interactions, and alternatives were discussed as indicated. I advised her to contact the office if her condition worsens, changes or fails to improve as anticipated.  She expressed understanding with the diagnosis(es) and plan     FOLLOW-UP   1 year    Patient was made aware and verbalized understanding that an appointment will be scheduled for them for a virtual visit and/or office visit within the above time frame. Patient understanding his/her responsibility to call and change time/date if he/she so chooses. Thank you, Vin Bateman MD for allowing me to participate in the care of this extraordinarily pleasant female. Please do not hesitate to contact me for further questions/concerns. Lali Valenzuela, MAGDY      [address] [place] [address], [facility]. Blanca Burgos.    Stefani Harmon  [phone] / [phone] Fax   [phone] / [phone] Fax        Greater than 25 minutes was spent in direct video patient care, planning and chart review. This visit was conducted using [employer] telemedicine services.

## 2021-04-22 ENCOUNTER — OFFICE VISIT (OUTPATIENT)
Dept: CARDIOLOGY CLINIC | Age: 26
End: 2021-04-22
Payer: COMMERCIAL

## 2021-04-22 VITALS
HEART RATE: 82 BPM | DIASTOLIC BLOOD PRESSURE: 80 MMHG | WEIGHT: 139 LBS | HEIGHT: 68 IN | SYSTOLIC BLOOD PRESSURE: 122 MMHG | OXYGEN SATURATION: 97 % | BODY MASS INDEX: 21.07 KG/M2

## 2021-04-22 DIAGNOSIS — I49.3 PVC (PREMATURE VENTRICULAR CONTRACTION): ICD-10-CM

## 2021-04-22 DIAGNOSIS — Z98.890 S/P ABLATION OF ACCESSORY BYPASS TRACT: Primary | ICD-10-CM

## 2021-04-22 DIAGNOSIS — I47.1 AVNRT (AV NODAL RE-ENTRY TACHYCARDIA) (HCC): ICD-10-CM

## 2021-04-22 PROCEDURE — 99212 OFFICE O/P EST SF 10 MIN: CPT | Performed by: NURSE PRACTITIONER

## 2021-04-22 PROCEDURE — 93000 ELECTROCARDIOGRAM COMPLETE: CPT | Performed by: NURSE PRACTITIONER

## 2021-04-22 NOTE — LETTER
4/22/2021 Patient: Mirtha Sheehan YOB: 1995 Date of Visit: 4/22/2021 MD [address] [facility] [address] Via Fax: [phone] Dear Charito Mancia MD, Thank you for referring Ms. Mirtha Sheehan to [facility] for evaluation. My notes for this consultation are attached. If you have questions, please do not hesitate to call me. I look forward to following your patient along with you.  
 
 
Sincerely, 
 
Nery Shannon NP

## 2021-04-22 NOTE — PROGRESS NOTES
Mirtha Sheehan is a 23 y.o. female    Chief Complaint   Patient presents with    Follow-up     annual,AVNRT       Chest pain No    SOB No    Dizziness No    Swelling No    Refills No    Visit Vitals  /80 (BP 1 Location: Left upper arm, BP Patient Position: Sitting)   Pulse 82   Ht 5' 8\" (1.727 m)   Wt 139 lb (63 kg)   SpO2 97%   BMI 21.13 kg/m²       1. Have you been to the ER, urgent care clinic since your last visit? Hospitalized since your last visit? no    2. Have you seen or consulted any other health care providers outside of the [address] [place] [address] since your last visit? Include any pap smears or colon screening.   no

## 2021-04-22 NOTE — PROGRESS NOTES
[facility]  [id number] [place], [address] [facility]    Office [phone]  Fax [phone]          HISTORY OF PRESENTING ILLNESS      Daly Melton is a 23 y.o. female who is here for follow-up. 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 Post ablation metoprolol was discontinued and a 30-day monitor which demonstrated PVCs during which patient reported palpitations. She was also noticed to have several episodes of long RP tachycardias with heart rates into the 150s-160s however these were asymptomatic. Pt today states she is doing well. She continues to have occasional PVCs, most notably around the time of her menstrual cycle. These palpitations are noticable, but not impeding quality of life. She reports adequate hydration and ability to exercise without issue. ACTIVE PROBLEM LIST     There are no active problems to display for this patient. PAST MEDICAL HISTORY     History reviewed. No pertinent past medical history. PAST SURGICAL HISTORY     Past Surgical History:   Procedure Laterality Date    HX SVT ABLATION  01/17/2019          ALLERGIES     Allergies   Allergen Reactions    Sulfa (Sulfonamide Antibiotics) Itching          FAMILY HISTORY     No family history on file. negative for cardiac disease       SOCIAL HISTORY     Social History     Socioeconomic History    Marital status: SINGLE     Spouse name: Not on file    Number of children: Not on file    Years of education: Not on file    Highest education level: Not on file   Tobacco Use    Smoking status: Never Smoker    Smokeless tobacco: Never Used   Substance and Sexual Activity    Alcohol use:  Yes     Alcohol/week: 1.0 standard drinks     Types: 1 Glasses of wine per week         MEDICATIONS     Current Outpatient Medications   Medication Sig    sertraline (ZOLOFT) 100 mg tablet Take 100 mg by mouth daily.  drospirenone-ethinyl estradiol (JUNIOR) 3-0.03 mg tab Take  by mouth daily. No current facility-administered medications for this visit. I have reviewed the nurses notes, vitals, problem list, allergy list, medical history, family, social history and medications. REVIEW OF SYMPTOMS      (Positive findings above)  Constitutional: Negative for fever, chills, and diaphoresis. Respiratory: Negative for cough, hemoptysis, sputum production, shortness of breath and wheezing. Cardiovascular: Negative for chest pain, leg swelling and PND. Gastrointestinal: Negative for nausea, vomiting, blood in stool and melena. Genitourinary: Negative for dysuria and flank pain. Neurological: Negative for focal weakness, seizures, loss of consciousness  Endo/Heme/Allergies: Negative for abnormal bleeding. Psychiatric/Behavioral: Negative for memory loss. PHYSICAL EXAMINATION      Visit Vitals  /80 (BP 1 Location: Left upper arm, BP Patient Position: Sitting)   Pulse 82   Ht 5' 8\" (1.727 m)   Wt 139 lb (63 kg)   SpO2 97%   BMI 21.13 kg/m²     General - well developed well nourished  Neck - neck supple, no JVD  Cardiac - normal S1, S2, RRR, no murmurs, rubs or gallops.  No clicks  Vascular - radials pulses equal bilateral  Lungs - clear to auscultation bilaterals, no rales, wheezing or rhonchi  Abd - soft nontender, non-distended, +BS  Extremities - no edema, warm  Neuro - nonfocal  Psych - normal mood and affect       DIAGNOSTIC DATA      EKG: Sinus, NSST changes       LABORATORY DATA      Lab Results   Component Value Date/Time    WBC 7.5 01/17/2019 01:26 PM    HGB 11.8 01/17/2019 01:26 PM    HCT 36.6 01/17/2019 01:26 PM    PLATELET 664 38/24/2758 01:26 PM    MCV 80.4 01/17/2019 01:26 PM      Lab Results   Component Value Date/Time    Sodium 139 01/17/2019 01:26 PM    Potassium 3.8 01/17/2019 01:26 PM    Chloride 103 01/17/2019 01:26 PM CO2 25 01/17/2019 01:26 PM    Anion gap 11 01/17/2019 01:26 PM    Glucose 86 01/17/2019 01:26 PM    BUN 9 01/17/2019 01:26 PM    Creatinine 0.76 01/17/2019 01:26 PM    BUN/Creatinine ratio 12 01/17/2019 01:26 PM    GFR est AA >60 01/17/2019 01:26 PM    GFR est non-AA >60 01/17/2019 01:26 PM    Calcium 8.9 01/17/2019 01:26 PM         ASSESSMENT      1. Supraventricular tachycardia              A.  Typical AVNRT ablation 1/17/2019  2. Premature ventricular contractions       ICD-10-CM ICD-9-CM    1. S/P ablation of accessory bypass tract  Z98.890 V45.89    2. AVNRT (AV thee re-entry tachycardia) ([facility])  I47.1 427.89 AMB POC EKG ROUTINE W/ 12 LEADS, INTER & REP   3. PVC (premature ventricular contraction)  I49.3 427.69      Orders Placed This Encounter    AMB POC EKG ROUTINE W/ 12 LEADS, INTER & REP     Order Specific Question:   Reason for Exam:     Answer:   AVNRT        PLAN     Pt doing well and without c/o. Continue to monitor for progression of symptoms in which case would consider repeat monitoring to evaluate PVC burden and presence of tachycardia. We discussed the expected course, resolution and complications of the diagnosis(es) in detail. Medication risks, benefits, costs, interactions, and alternatives were discussed as indicated. I advised her to contact the office if her condition worsens, changes or fails to improve as anticipated. She expressed understanding with the diagnosis(es) and plan     FOLLOW-UP     1 year    Thank you, Hellen Salguero MD for allowing me to participate in the care of this extraordinarily pleasant female. Please do not hesitate to contact me for further questions/concerns.        Candiss Najjar, NP

## 2022-04-25 ENCOUNTER — OFFICE VISIT (OUTPATIENT)
Dept: CARDIOLOGY CLINIC | Age: 27
End: 2022-04-25
Payer: COMMERCIAL

## 2022-04-25 VITALS
RESPIRATION RATE: 16 BRPM | BODY MASS INDEX: 21.67 KG/M2 | DIASTOLIC BLOOD PRESSURE: 70 MMHG | HEART RATE: 72 BPM | SYSTOLIC BLOOD PRESSURE: 114 MMHG | WEIGHT: 143 LBS | HEIGHT: 68 IN | OXYGEN SATURATION: 98 %

## 2022-04-25 DIAGNOSIS — I49.3 PVC (PREMATURE VENTRICULAR CONTRACTION): ICD-10-CM

## 2022-04-25 DIAGNOSIS — I47.1 AVNRT (AV NODAL RE-ENTRY TACHYCARDIA) (HCC): Primary | ICD-10-CM

## 2022-04-25 DIAGNOSIS — Z98.890 S/P ABLATION OF ACCESSORY BYPASS TRACT: ICD-10-CM

## 2022-04-25 PROCEDURE — 99214 OFFICE O/P EST MOD 30 MIN: CPT | Performed by: NURSE PRACTITIONER

## 2022-04-25 PROCEDURE — 93000 ELECTROCARDIOGRAM COMPLETE: CPT | Performed by: NURSE PRACTITIONER

## 2022-04-25 NOTE — PROGRESS NOTES
HISTORY OF PRESENTING ILLNESS      Dee Dee Hayes is a 27 y.o. female who is here for follow-up. 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 Post ablation metoprolol was discontinued and a 30-day monitor which demonstrated PVCs during which patient reported palpitations. She was also noticed to have several episodes of long RP tachycardias with heart rates into the 150s-160s however these were asymptomatic.    is doing well. Pt today states she continues to have occasional PVCs, most notably around the time of her menstrual cycle. These are manageable. ACTIVE PROBLEM LIST     There are no problems to display for this patient. PAST MEDICAL HISTORY     No past medical history on file. PAST SURGICAL HISTORY     Past Surgical History:   Procedure Laterality Date    HX SVT ABLATION  01/17/2019          ALLERGIES     Allergies   Allergen Reactions    Sulfa (Sulfonamide Antibiotics) Itching          FAMILY HISTORY     No family history on file. negative for cardiac disease       SOCIAL HISTORY     Social History     Socioeconomic History    Marital status: SINGLE   Tobacco Use    Smoking status: Never Smoker    Smokeless tobacco: Never Used   Substance and Sexual Activity    Alcohol use: Yes     Alcohol/week: 1.0 standard drink     Types: 1 Glasses of wine per week         MEDICATIONS     Current Outpatient Medications   Medication Sig    sertraline (ZOLOFT) 100 mg tablet Take 100 mg by mouth daily.  drospirenone-ethinyl estradiol (JUNIOR) 3-0.03 mg tab Take  by mouth daily. No current facility-administered medications for this visit. I have reviewed the nurses notes, vitals, problem list, allergy list, medical history, family, social history and medications.        REVIEW OF SYMPTOMS      (Positive findings above)  Constitutional: Negative for fever, chills, and diaphoresis. Respiratory: Negative for cough, hemoptysis, sputum production, shortness of breath and wheezing. Cardiovascular: Negative for chest pain, leg swelling and PND. Gastrointestinal: Negative for nausea, vomiting, blood in stool and melena. Genitourinary: Negative for dysuria and flank pain. Neurological: Negative for focal weakness, seizures, loss of consciousness  Endo/Heme/Allergies: Negative for abnormal bleeding. Psychiatric/Behavioral: Negative for memory loss. PHYSICAL EXAMINATION      There were no vitals taken for this visit. General - well developed well nourished  Neck - neck supple, no JVD  Cardiac - normal S1, S2, RRR, no murmurs, rubs or gallops. No clicks  Vascular - radials pulses equal bilateral  Lungs - clear to auscultation bilaterals, no rales, wheezing or rhonchi  Abd - soft nontender, non-distended, +BS  Extremities - no edema, warm  Neuro - nonfocal  Psych - normal mood and affect       DIAGNOSTIC DATA      EKG: Sinus, NSST changes       LABORATORY DATA      Lab Results   Component Value Date/Time    WBC 7.5 01/17/2019 01:26 PM    HGB 11.8 01/17/2019 01:26 PM    HCT 36.6 01/17/2019 01:26 PM    PLATELET 618 76/24/1972 01:26 PM    MCV 80.4 01/17/2019 01:26 PM      Lab Results   Component Value Date/Time    Sodium 139 01/17/2019 01:26 PM    Potassium 3.8 01/17/2019 01:26 PM    Chloride 103 01/17/2019 01:26 PM    CO2 25 01/17/2019 01:26 PM    Anion gap 11 01/17/2019 01:26 PM    Glucose 86 01/17/2019 01:26 PM    BUN 9 01/17/2019 01:26 PM    Creatinine 0.76 01/17/2019 01:26 PM    BUN/Creatinine ratio 12 01/17/2019 01:26 PM    GFR est AA >60 01/17/2019 01:26 PM    GFR est non-AA >60 01/17/2019 01:26 PM    Calcium 8.9 01/17/2019 01:26 PM         ASSESSMENT      1. Supraventricular tachycardia              A.  Typical AVNRT ablation 1/17/2019  2.   Premature ventricular contractions        PLAN     She is doing well and feels her palpitations are well controlled. She will continue aggressive hydration and repeat monitoring should her symptoms progress. FOLLOW-UP     prn    Thank you, Imer Tubbs MD for allowing me to participate in the care of this extraordinarily pleasant female. Please do not hesitate to contact me for further questions/concerns.        Orly Mancilla, NP

## 2022-04-25 NOTE — LETTER
4/25/2022    Patient: Prudencio Morales   YOB: 1995   Date of Visit: 4/25/2022     Liliana Faith MD  [facility] [address] [place] [address]  Via Fax: [phone]    Dear Liliana Faith MD,      Thank you for referring Ms. Prudencio Morales to [facility] for evaluation. My notes for this consultation are attached. If you have questions, please do not hesitate to call me. I look forward to following your patient along with you.       Sincerely,    Orly Mancilla NP

## 2022-04-25 NOTE — PROGRESS NOTES
Room #: 2    Had an episode of palpitations in February all day. Now just during her menstrual cycle. Visit Vitals  /70 (BP 1 Location: Left upper arm, BP Patient Position: Sitting, BP Cuff Size: Adult)   Pulse 72   Resp 16   Ht 5' 8\" (1.727 m)   Wt 143 lb (64.9 kg)   SpO2 98%   BMI 21.74 kg/m²         Chest pain:  NO  Shortness of breath:  NO  Edema: NO  Palpitations, skipped beats, rapid heartbeat:  YES  Dizziness:  NO    1. Have you been to the ER, urgent care clinic since your last visit? Hospitalized since your last visit? No    2. Have you seen or consulted any other health care providers outside of the [address] [place] [address] since your last visit? Include any pap smears or colon screening.  No      Refills:  NO

## 2023-05-22 RX ORDER — DROSPIRENONE AND ETHINYL ESTRADIOL 0.03MG-3MG
KIT ORAL DAILY
COMMUNITY

## 2023-05-22 RX ORDER — SERTRALINE HYDROCHLORIDE 100 MG/1
100 TABLET, FILM COATED ORAL DAILY
COMMUNITY

## 2023-10-19 ENCOUNTER — OFFICE VISIT (OUTPATIENT)
Age: 28
End: 2023-10-19

## 2023-10-19 VITALS
OXYGEN SATURATION: 98 % | TEMPERATURE: 97.9 F | HEART RATE: 83 BPM | WEIGHT: 141 LBS | HEIGHT: 69 IN | BODY MASS INDEX: 20.88 KG/M2 | DIASTOLIC BLOOD PRESSURE: 78 MMHG | SYSTOLIC BLOOD PRESSURE: 125 MMHG

## 2023-10-19 DIAGNOSIS — Z86.79 H/O SUPRAVENTRICULAR TACHYCARDIA: ICD-10-CM

## 2023-10-19 DIAGNOSIS — Z00.00 ADULT GENERAL MEDICAL EXAMINATION: Primary | ICD-10-CM

## 2023-10-19 DIAGNOSIS — Z30.41 ENCOUNTER FOR SURVEILLANCE OF CONTRACEPTIVE PILLS: ICD-10-CM

## 2023-10-19 DIAGNOSIS — Z23 NEED FOR INFLUENZA VACCINATION: ICD-10-CM

## 2023-10-19 DIAGNOSIS — Z13.220 SCREENING FOR LIPID DISORDERS: ICD-10-CM

## 2023-10-19 DIAGNOSIS — K21.9 GASTROESOPHAGEAL REFLUX DISEASE WITHOUT ESOPHAGITIS: ICD-10-CM

## 2023-10-19 DIAGNOSIS — F41.1 GENERALIZED ANXIETY DISORDER: ICD-10-CM

## 2023-10-19 DIAGNOSIS — F42.9 OBSESSIVE-COMPULSIVE DISORDER, UNSPECIFIED TYPE: ICD-10-CM

## 2023-10-19 LAB
ALBUMIN SERPL-MCNC: 3.9 G/DL (ref 3.5–5)
ALBUMIN/GLOB SERPL: 0.9 (ref 1.1–2.2)
ALP SERPL-CCNC: 72 U/L (ref 45–117)
ALT SERPL-CCNC: 19 U/L (ref 12–78)
ANION GAP SERPL CALC-SCNC: 3 MMOL/L (ref 5–15)
APPEARANCE UR: CLEAR
AST SERPL-CCNC: 15 U/L (ref 15–37)
BASOPHILS # BLD: 0.1 K/UL (ref 0–0.1)
BASOPHILS NFR BLD: 1 % (ref 0–1)
BILIRUB SERPL-MCNC: 0.3 MG/DL (ref 0.2–1)
BILIRUB UR QL: NEGATIVE
BUN SERPL-MCNC: 8 MG/DL (ref 6–20)
BUN/CREAT SERPL: 12 (ref 12–20)
CALCIUM SERPL-MCNC: 9.3 MG/DL (ref 8.5–10.1)
CHLORIDE SERPL-SCNC: 103 MMOL/L (ref 97–108)
CHOLEST SERPL-MCNC: 294 MG/DL
CO2 SERPL-SCNC: 28 MMOL/L (ref 21–32)
COLOR UR: NORMAL
CREAT SERPL-MCNC: 0.67 MG/DL (ref 0.55–1.02)
DIFFERENTIAL METHOD BLD: ABNORMAL
EOSINOPHIL # BLD: 0.2 K/UL (ref 0–0.4)
EOSINOPHIL NFR BLD: 2 % (ref 0–7)
ERYTHROCYTE [DISTWIDTH] IN BLOOD BY AUTOMATED COUNT: 14.5 % (ref 11.5–14.5)
GLOBULIN SER CALC-MCNC: 4.3 G/DL (ref 2–4)
GLUCOSE SERPL-MCNC: 85 MG/DL (ref 65–100)
GLUCOSE UR STRIP.AUTO-MCNC: NEGATIVE MG/DL
HCT VFR BLD AUTO: 40.7 % (ref 35–47)
HDLC SERPL-MCNC: 80 MG/DL
HDLC SERPL: 3.7 (ref 0–5)
HGB BLD-MCNC: 12.9 G/DL (ref 11.5–16)
HGB UR QL STRIP: NEGATIVE
IMM GRANULOCYTES # BLD AUTO: 0 K/UL (ref 0–0.04)
IMM GRANULOCYTES NFR BLD AUTO: 0 % (ref 0–0.5)
KETONES UR QL STRIP.AUTO: NEGATIVE MG/DL
LDLC SERPL CALC-MCNC: 191.4 MG/DL (ref 0–100)
LEUKOCYTE ESTERASE UR QL STRIP.AUTO: NEGATIVE
LYMPHOCYTES # BLD: 2.5 K/UL (ref 0.8–3.5)
LYMPHOCYTES NFR BLD: 33 % (ref 12–49)
MCH RBC QN AUTO: 25.8 PG (ref 26–34)
MCHC RBC AUTO-ENTMCNC: 31.7 G/DL (ref 30–36.5)
MCV RBC AUTO: 81.4 FL (ref 80–99)
MONOCYTES # BLD: 0.6 K/UL (ref 0–1)
MONOCYTES NFR BLD: 8 % (ref 5–13)
NEUTS SEG # BLD: 4.2 K/UL (ref 1.8–8)
NEUTS SEG NFR BLD: 56 % (ref 32–75)
NITRITE UR QL STRIP.AUTO: NEGATIVE
NRBC # BLD: 0 K/UL (ref 0–0.01)
NRBC BLD-RTO: 0 PER 100 WBC
PH UR STRIP: 6.5 (ref 5–8)
PLATELET # BLD AUTO: 302 K/UL (ref 150–400)
PMV BLD AUTO: 10.9 FL (ref 8.9–12.9)
POTASSIUM SERPL-SCNC: 4.2 MMOL/L (ref 3.5–5.1)
PROT SERPL-MCNC: 8.2 G/DL (ref 6.4–8.2)
PROT UR STRIP-MCNC: NEGATIVE MG/DL
RBC # BLD AUTO: 5 M/UL (ref 3.8–5.2)
SODIUM SERPL-SCNC: 134 MMOL/L (ref 136–145)
SP GR UR REFRACTOMETRY: 1.01 (ref 1–1.03)
TRIGL SERPL-MCNC: 113 MG/DL
TSH SERPL DL<=0.05 MIU/L-ACNC: 3.11 UIU/ML (ref 0.36–3.74)
UROBILINOGEN UR QL STRIP.AUTO: 0.2 EU/DL (ref 0.2–1)
VLDLC SERPL CALC-MCNC: 22.6 MG/DL
WBC # BLD AUTO: 7.5 K/UL (ref 3.6–11)

## 2023-10-19 RX ORDER — SERTRALINE HYDROCHLORIDE 100 MG/1
150 TABLET, FILM COATED ORAL DAILY
Qty: 90 TABLET | Refills: 3 | Status: SHIPPED | OUTPATIENT
Start: 2023-10-19

## 2023-10-19 RX ORDER — DROSPIRENONE AND ETHINYL ESTRADIOL 0.03MG-3MG
1 KIT ORAL DAILY
Qty: 3 PACKET | Refills: 3 | Status: SHIPPED | OUTPATIENT
Start: 2023-10-19

## 2023-10-19 SDOH — ECONOMIC STABILITY: HOUSING INSECURITY
IN THE LAST 12 MONTHS, WAS THERE A TIME WHEN YOU DID NOT HAVE A STEADY PLACE TO SLEEP OR SLEPT IN A SHELTER (INCLUDING NOW)?: NO

## 2023-10-19 SDOH — ECONOMIC STABILITY: FOOD INSECURITY: WITHIN THE PAST 12 MONTHS, YOU WORRIED THAT YOUR FOOD WOULD RUN OUT BEFORE YOU GOT MONEY TO BUY MORE.: NEVER TRUE

## 2023-10-19 SDOH — ECONOMIC STABILITY: FOOD INSECURITY: WITHIN THE PAST 12 MONTHS, THE FOOD YOU BOUGHT JUST DIDN'T LAST AND YOU DIDN'T HAVE MONEY TO GET MORE.: NEVER TRUE

## 2023-10-19 SDOH — ECONOMIC STABILITY: INCOME INSECURITY: HOW HARD IS IT FOR YOU TO PAY FOR THE VERY BASICS LIKE FOOD, HOUSING, MEDICAL CARE, AND HEATING?: NOT HARD AT ALL

## 2023-10-19 ASSESSMENT — PATIENT HEALTH QUESTIONNAIRE - PHQ9
SUM OF ALL RESPONSES TO PHQ QUESTIONS 1-9: 0
SUM OF ALL RESPONSES TO PHQ9 QUESTIONS 1 & 2: 0
SUM OF ALL RESPONSES TO PHQ QUESTIONS 1-9: 0
1. LITTLE INTEREST OR PLEASURE IN DOING THINGS: 0
SUM OF ALL RESPONSES TO PHQ QUESTIONS 1-9: 0
2. FEELING DOWN, DEPRESSED OR HOPELESS: 0
SUM OF ALL RESPONSES TO PHQ QUESTIONS 1-9: 0

## 2023-10-19 ASSESSMENT — ENCOUNTER SYMPTOMS
COLOR CHANGE: 0
ABDOMINAL PAIN: 0
DIARRHEA: 0
SHORTNESS OF BREATH: 0
EYE ITCHING: 0
SORE THROAT: 0
NAUSEA: 0
CONSTIPATION: 0
CHEST TIGHTNESS: 0
VOMITING: 0
WHEEZING: 0
RHINORRHEA: 0
COUGH: 0

## 2023-10-19 NOTE — PROGRESS NOTES
Chief Complaint   Patient presents with    [facility]     Re-establish care with Dr. Fransisca Hollins    Annual Exam     Patient has eaten light today. Would like flu shot today. 1. \"Have you been to the ER, urgent care clinic since your last visit? Hospitalized since your last visit? \"   no     2. \"Have you seen or consulted any other health care providers outside of the [address] [place] [address] since your last visit? \"      no     3. Last pap?: With Dr. Fransisca Hollins. Records requested.           10/19/2023     1:32 PM   PHQ-9    Little interest or pleasure in doing things 0   Feeling down, depressed, or hopeless 0   PHQ-2 Score 0   PHQ-9 Total Score 0           Financial Resource Strain: Low Risk  (10/19/2023)    Overall Financial Resource Strain (CARDIA)     Difficulty of Paying Living Expenses: Not hard at all      Food Insecurity: No Food Insecurity (10/19/2023)    Hunger Vital Sign     Worried About Running Out of Food in the Last Year: Never true     Ran Out of Food in the Last Year: Never true          Health Maintenance Due   Topic Date Due    Hepatitis B vaccine (1 of 3 - 3-dose series) Never done    Varicella vaccine (1 of 2 - 2-dose childhood series) Never done    HPV vaccine (1 - 2-dose series) Never done    HIV screen  Never done    Hepatitis C screen  Never done    DTaP/Tdap/Td vaccine (1 - Tdap) Never done    Pap smear  Never done    COVID-19 Vaccine (2 - Moderna series) 02/20/2021    Depression Screen  04/25/2023    Flu vaccine (1) Never done

## 2023-10-19 NOTE — PROGRESS NOTES
Subjective:       Radha Rai is a 34 y.o. female and is here for a comprehensive physical exam and fasting labs. Patient has regular cycles every 4 weeks, has occasional cramping and clots, normal flow. Last Pap smear was normal.  Patient's last lipid panel was abnormal with elevated LDL-C at 166. Results scanned into chart. Past medical history, family history, meds reviewed and updated. Patient states her anxiety and obsessive disorder has been well controlled on the sertraline 100 mg daily, denies any side effects to it. The patient reports problems - none     History:    LMP: Patient's last menstrual period was 10/04/2023. Last pap date: 10/2022, normal.  Sexually active: yes.   Abnormal pap? no  : 0  Para: 0    Past Medical History:   Diagnosis Date    Encounter for surveillance of contraceptive pills 10/19/2023    Generalized anxiety disorder 10/19/2023    H/O supraventricular tachycardia 10/19/2023    Obsessive-compulsive disorder 10/19/2023     Patient Active Problem List    Diagnosis Date Noted    Obsessive-compulsive disorder 10/19/2023    Generalized anxiety disorder 10/19/2023    Encounter for surveillance of contraceptive pills 10/19/2023    H/O supraventricular tachycardia 10/19/2023     Past Surgical History:   Procedure Laterality Date    ABLATION OF DYSRHYTHMIC FOCUS  2019     Family History   Problem Relation Age of Onset    No Known Problems Mother     Asthma Father      Social History     Socioeconomic History    Marital status: Single     Spouse name: None    Number of children: None    Years of education: None    Highest education level: None   Tobacco Use    Smoking status: Never     Passive exposure: Never    Smokeless tobacco: Never   Vaping Use    Vaping Use: Never used   Substance and Sexual Activity    Alcohol use: Not Currently     Alcohol/week: 1.0 standard drink of alcohol     Types: 1 Drinks containing 0.5 oz of alcohol per week     Comment: Like once or twice

## 2023-10-21 ENCOUNTER — PATIENT MESSAGE (OUTPATIENT)
Age: 28
End: 2023-10-21

## 2023-10-21 DIAGNOSIS — E78.5 HYPERLIPIDEMIA, UNSPECIFIED HYPERLIPIDEMIA TYPE: ICD-10-CM

## 2023-10-21 DIAGNOSIS — Z13.6 ENCOUNTER FOR SCREENING FOR CORONARY ARTERY DISEASE: Primary | ICD-10-CM

## 2023-10-23 ENCOUNTER — PATIENT MESSAGE (OUTPATIENT)
Age: 28
End: 2023-10-23

## 2023-10-23 DIAGNOSIS — Z30.41 ENCOUNTER FOR SURVEILLANCE OF CONTRACEPTIVE PILLS: ICD-10-CM

## 2023-10-23 DIAGNOSIS — F41.1 GENERALIZED ANXIETY DISORDER: ICD-10-CM

## 2023-10-23 RX ORDER — DROSPIRENONE AND ETHINYL ESTRADIOL 0.03MG-3MG
1 KIT ORAL DAILY
Qty: 3 PACKET | Refills: 0 | Status: SHIPPED | OUTPATIENT
Start: 2023-10-23

## 2023-10-23 RX ORDER — SERTRALINE HYDROCHLORIDE 100 MG/1
150 TABLET, FILM COATED ORAL DAILY
Qty: 90 TABLET | Refills: 0 | Status: SHIPPED | OUTPATIENT
Start: 2023-10-23 | End: 2023-10-26 | Stop reason: SDUPTHER

## 2023-10-23 NOTE — TELEPHONE ENCOUNTER
From: Ramona Bowman  To: Dr. Delbert Tinsley: 10/23/2023 9:26 AM EDT  Subject: Rx    Hello again,  Sorry to message twice, but I was hoping that you could actually send [facility] Rx to the [facility]. I have to change a few things on my [facility] which will take some time and I'm already on my last few pills. Thank you!

## 2023-10-24 NOTE — TELEPHONE ENCOUNTER
Patient LOV: 10/19/2023   Next OV: 10/23/2023   Please send statin to pharmacy on file. And please send CT heart order.

## 2023-10-24 NOTE — TELEPHONE ENCOUNTER
From: Ramona Bowman  Sent: 10/23/2023 7:36 PM EDT  To: Joaquin Jason Clinical Staff  Subject: High Cholesterol    Yes to both!  Thank you

## 2023-10-25 ENCOUNTER — PATIENT MESSAGE (OUTPATIENT)
Age: 28
End: 2023-10-25

## 2023-10-25 DIAGNOSIS — F41.1 GENERALIZED ANXIETY DISORDER: ICD-10-CM

## 2023-10-25 RX ORDER — ROSUVASTATIN CALCIUM 5 MG/1
5 TABLET, COATED ORAL DAILY
Qty: 90 TABLET | Refills: 1 | Status: SHIPPED | OUTPATIENT
Start: 2023-10-25

## 2023-10-25 NOTE — TELEPHONE ENCOUNTER
From: Rhoderick Lesches  To: Dr. Stanley Briscoe: 10/25/2023 2:51 PM EDT  Subject: Sertraline    Hi,  I called yesterday about getting s short term script written for my sertraline because I won't get it delivered until Nov.7th and I am out of the medication as of today. I was told it would be sent in and I'd be able to pick it up today, but haven't heard anything.   Rhoderick Lesches

## 2023-10-26 RX ORDER — SERTRALINE HYDROCHLORIDE 100 MG/1
150 TABLET, FILM COATED ORAL DAILY
Qty: 21 TABLET | Refills: 0 | Status: SHIPPED | OUTPATIENT
Start: 2023-10-26

## 2023-11-13 ENCOUNTER — HOSPITAL ENCOUNTER (OUTPATIENT)
Facility: HOSPITAL | Age: 28
Discharge: HOME | End: 2023-11-16
Attending: INTERNAL MEDICINE

## 2023-11-13 DIAGNOSIS — E78.5 HYPERLIPIDEMIA, UNSPECIFIED HYPERLIPIDEMIA TYPE: ICD-10-CM

## 2023-11-13 DIAGNOSIS — Z13.6 ENCOUNTER FOR SCREENING FOR CORONARY ARTERY DISEASE: ICD-10-CM

## 2023-11-13 PROCEDURE — 75571 CT HRT W/O DYE W/CA TEST: CPT

## 2023-11-16 ENCOUNTER — TELEPHONE (OUTPATIENT)
Age: 28
End: 2023-11-16

## 2023-11-16 ENCOUNTER — OFFICE VISIT (OUTPATIENT)
Age: 28
End: 2023-11-16
Payer: COMMERCIAL

## 2023-11-16 VITALS
BODY MASS INDEX: 21.03 KG/M2 | WEIGHT: 142 LBS | DIASTOLIC BLOOD PRESSURE: 63 MMHG | HEIGHT: 69 IN | TEMPERATURE: 98.1 F | OXYGEN SATURATION: 97 % | SYSTOLIC BLOOD PRESSURE: 108 MMHG | HEART RATE: 63 BPM

## 2023-11-16 DIAGNOSIS — E78.2 MIXED HYPERLIPIDEMIA: ICD-10-CM

## 2023-11-16 DIAGNOSIS — R10.9 ABDOMINAL BLOATING WITH CRAMPS: ICD-10-CM

## 2023-11-16 DIAGNOSIS — R14.0 ABDOMINAL BLOATING WITH CRAMPS: ICD-10-CM

## 2023-11-16 DIAGNOSIS — R10.30 LOWER ABDOMINAL PAIN: Primary | ICD-10-CM

## 2023-11-16 DIAGNOSIS — R10.84 GENERALIZED ABDOMINAL PAIN: ICD-10-CM

## 2023-11-16 PROCEDURE — 99214 OFFICE O/P EST MOD 30 MIN: CPT | Performed by: INTERNAL MEDICINE

## 2023-11-16 ASSESSMENT — ENCOUNTER SYMPTOMS
CONSTIPATION: 0
SHORTNESS OF BREATH: 0
NAUSEA: 0
ANAL BLEEDING: 0
ABDOMINAL PAIN: 1
COLOR CHANGE: 0
RHINORRHEA: 0
CHEST TIGHTNESS: 0
VOMITING: 0
FACIAL SWELLING: 0
COUGH: 0
WHEEZING: 0
SORE THROAT: 0

## 2023-11-16 NOTE — TELEPHONE ENCOUNTER
Pt called said today when she went home she used the restroom and noticed blood. It's abnormal and she is not on her period.     BCB# [phone]

## 2023-11-16 NOTE — PROGRESS NOTES
Cecile Magana (:  1995) is a 34 y.o. female,here for evaluation of the following chief complaint(s):   Chief Complaint   Patient presents with    GI Problem     C/o Constipation, lower abdominal pain, and nausea, had 1 instance of near vomiting; symptoms were last week, now are resolving. But patient is concerned that this is the second time she has had symptoms like this within the past month or so. GI Problem (C/o Constipation, lower abdominal pain, and nausea, had 1 instance of near vomiting; symptoms were last week, now are resolving. But patient is concerned that this is the second time she has had symptoms like this within the past month or so.)        SUBJECTIVE/OBJECTIVE:    HPI: Patient is here with a 4 to 6-week history of lower abdominal pain and cramping, patient has had 2 episodes of the symptoms, happened 1 week after end of her cycle. Symptoms started with lower abdominal cramping, bloating moving all over the lower belly, no radiation to the back. The pain was moderate in nature and lasted for almost 3 days. Patient states she had a lot of burping and bloating with it and no change in bowel movements except when she went the stools were hard. Patient also states she had nausea with it but no vomiting. She took some Pepto-Bismol which did not improve the symptoms. Patient denies eating anything different than the night before or changing her daily activity. She states she eats a salad 3 days a week and otherwise eats generally healthy food. Does not do a lot of fast food. Denies any loss of appetite, heartburn. Otherwise patient has bowel movements daily which are normal appearing. Review of Systems   Constitutional:  Negative for activity change, appetite change, chills, fatigue and fever. HENT:  Negative for facial swelling, postnasal drip, rhinorrhea, sneezing and sore throat. Respiratory:  Negative for cough, chest tightness, shortness of breath and wheezing.

## 2023-11-16 NOTE — TELEPHONE ENCOUNTER
S/w patient. Blood was on the tissue and in the toilet, patient is confident it is vaginal blood. She is not due for her period at this time, but says it seems she is having some spotting, which is not usual for her., but patient will monitor sx and follow up PRN.     Also advised to monitor for dark or tarry stools just in case. Contact us if she does notice any rectal bleeding.

## 2023-11-16 NOTE — PROGRESS NOTES
Chief Complaint   Patient presents with    GI Problem     C/o Constipation, lower abdominal pain, and nausea, had 1 instance of near vomiting; symptoms were last week, now are resolving. But patient is concerned that this is the second time she has had symptoms like this within the past month or so. 1. \"Have you been to the ER, urgent care clinic since your last visit? Hospitalized since your last visit? \"    no    2. \"Have you seen or consulted any other health care providers outside of the [address] [place] [address] since your last visit? \"  no         3. For patients aged 43-73: Has the patient had a colonoscopy / FIT/ Cologuard?  N/a            10/19/2023     1:32 PM   PHQ-9    Little interest or pleasure in doing things 0   Feeling down, depressed, or hopeless 0   PHQ-2 Score 0   PHQ-9 Total Score 0           Financial Resource Strain: Low Risk  (10/19/2023)    Overall Financial Resource Strain (CARDIA)     Difficulty of Paying Living Expenses: Not hard at all      Food Insecurity: No Food Insecurity (10/19/2023)    Hunger Vital Sign     Worried About Running Out of Food in the Last Year: Never true     Ran Out of Food in the Last Year: Never true          Health Maintenance Due   Topic Date Due    Hepatitis B vaccine (1 of 3 - 3-dose series) Never done    Varicella vaccine (1 of 2 - 2-dose childhood series) Never done    HPV vaccine (1 - 2-dose series) Never done    HIV screen  Never done    Hepatitis C screen  Never done    DTaP/Tdap/Td vaccine (1 - Tdap) Never done    Pap smear  Never done    COVID-19 Vaccine (2 - 2023-24 season) 09/01/2023

## 2024-01-05 ENCOUNTER — PATIENT MESSAGE (OUTPATIENT)
Age: 29
End: 2024-01-05

## 2024-01-05 DIAGNOSIS — R10.84 GENERALIZED ABDOMINAL PAIN: Primary | ICD-10-CM

## 2024-03-26 ENCOUNTER — HOSPITAL ENCOUNTER (OUTPATIENT)
Facility: HOSPITAL | Age: 29
Discharge: HOME | End: 2024-03-29
Attending: INTERNAL MEDICINE
Payer: COMMERCIAL

## 2024-03-26 DIAGNOSIS — R10.30 LOWER ABDOMINAL PAIN: ICD-10-CM

## 2024-03-26 PROCEDURE — 74177 CT ABD & PELVIS W/CONTRAST: CPT

## 2024-03-26 PROCEDURE — 6360000004 HC RX CONTRAST MEDICATION: Performed by: INTERNAL MEDICINE

## 2024-03-26 RX ADMIN — IOPAMIDOL 100 ML: 755 INJECTION, SOLUTION INTRAVENOUS at 15:11

## 2024-04-16 DIAGNOSIS — E78.5 HYPERLIPIDEMIA, UNSPECIFIED HYPERLIPIDEMIA TYPE: ICD-10-CM

## 2024-04-16 RX ORDER — ROSUVASTATIN CALCIUM 5 MG/1
5 TABLET, COATED ORAL DAILY
Qty: 90 TABLET | Refills: 1 | Status: SHIPPED | OUTPATIENT
Start: 2024-04-16

## 2024-04-16 NOTE — TELEPHONE ENCOUNTER
Last appointment: 11/16/23, 10/19/23 Vickie, labs 10/2023  Next appointment: None  Previous refill encounter(s): 10/25/23 90 + 1    Requested Prescriptions     Pending Prescriptions Disp Refills    rosuvastatin (CRESTOR) 5 MG tablet 90 tablet 1     Sig: Take 1 tablet by mouth daily     For Pharmacy Admin Tracking Only    Program: Medication Refill  CPA in place:    Recommendation Provided To:   Intervention Detail: New Rx: 1, reason: Patient Preference  Intervention Accepted By:   Gap Closed?:    Time Spent (min): 5

## 2024-06-03 DIAGNOSIS — F41.1 GENERALIZED ANXIETY DISORDER: ICD-10-CM

## 2024-06-03 RX ORDER — SERTRALINE HYDROCHLORIDE 100 MG/1
150 TABLET, FILM COATED ORAL DAILY
Qty: 135 TABLET | Refills: 0 | Status: SHIPPED | OUTPATIENT
Start: 2024-06-03

## 2024-09-02 DIAGNOSIS — F41.1 GENERALIZED ANXIETY DISORDER: ICD-10-CM

## 2024-09-03 RX ORDER — SERTRALINE HYDROCHLORIDE 100 MG/1
TABLET, FILM COATED ORAL
Qty: 135 TABLET | Refills: 0 | Status: SHIPPED | OUTPATIENT
Start: 2024-09-03

## 2024-09-06 DIAGNOSIS — Z30.41 ENCOUNTER FOR SURVEILLANCE OF CONTRACEPTIVE PILLS: ICD-10-CM

## 2024-09-08 RX ORDER — DROSPIRENONE AND ETHINYL ESTRADIOL 0.03MG-3MG
1 KIT ORAL DAILY
Qty: 84 TABLET | Refills: 0 | Status: SHIPPED | OUTPATIENT
Start: 2024-09-08

## 2024-09-16 ENCOUNTER — HOSPITAL ENCOUNTER (OUTPATIENT)
Facility: HOSPITAL | Age: 29
Discharge: HOME | End: 2024-09-19
Payer: COMMERCIAL

## 2024-09-16 ENCOUNTER — TRANSCRIBE ORDERS (OUTPATIENT)
Facility: HOSPITAL | Age: 29
End: 2024-09-16

## 2024-09-16 DIAGNOSIS — R10.10 UPPER ABDOMINAL PAIN: Primary | ICD-10-CM

## 2024-09-16 DIAGNOSIS — R10.10 UPPER ABDOMINAL PAIN: ICD-10-CM

## 2024-09-16 PROCEDURE — 74018 RADEX ABDOMEN 1 VIEW: CPT

## 2024-09-17 ENCOUNTER — ANESTHESIA EVENT (OUTPATIENT)
Facility: HOSPITAL | Age: 29
End: 2024-09-17
Payer: COMMERCIAL

## 2024-09-17 ENCOUNTER — HOSPITAL ENCOUNTER (OUTPATIENT)
Facility: HOSPITAL | Age: 29
Setting detail: OUTPATIENT SURGERY
Discharge: HOME | End: 2024-09-17
Attending: INTERNAL MEDICINE | Admitting: INTERNAL MEDICINE
Payer: COMMERCIAL

## 2024-09-17 ENCOUNTER — ANESTHESIA (OUTPATIENT)
Facility: HOSPITAL | Age: 29
End: 2024-09-17
Payer: COMMERCIAL

## 2024-09-17 VITALS
HEIGHT: 69 IN | DIASTOLIC BLOOD PRESSURE: 64 MMHG | TEMPERATURE: 97.8 F | OXYGEN SATURATION: 98 % | RESPIRATION RATE: 11 BRPM | SYSTOLIC BLOOD PRESSURE: 101 MMHG | WEIGHT: 143.52 LBS | HEART RATE: 91 BPM | BODY MASS INDEX: 21.26 KG/M2

## 2024-09-17 PROCEDURE — 88342 IMHCHEM/IMCYTCHM 1ST ANTB: CPT

## 2024-09-17 PROCEDURE — 2580000003 HC RX 258: Performed by: INTERNAL MEDICINE

## 2024-09-17 PROCEDURE — 2580000003 HC RX 258: Performed by: NURSE ANESTHETIST, CERTIFIED REGISTERED

## 2024-09-17 PROCEDURE — 6360000002 HC RX W HCPCS: Performed by: NURSE ANESTHETIST, CERTIFIED REGISTERED

## 2024-09-17 PROCEDURE — 88305 TISSUE EXAM BY PATHOLOGIST: CPT

## 2024-09-17 PROCEDURE — 7100000011 HC PHASE II RECOVERY - ADDTL 15 MIN: Performed by: INTERNAL MEDICINE

## 2024-09-17 PROCEDURE — 3600007502: Performed by: INTERNAL MEDICINE

## 2024-09-17 PROCEDURE — 3700000000 HC ANESTHESIA ATTENDED CARE: Performed by: INTERNAL MEDICINE

## 2024-09-17 PROCEDURE — 7100000010 HC PHASE II RECOVERY - FIRST 15 MIN: Performed by: INTERNAL MEDICINE

## 2024-09-17 PROCEDURE — 2709999900 HC NON-CHARGEABLE SUPPLY: Performed by: INTERNAL MEDICINE

## 2024-09-17 RX ORDER — PROPOFOL 10 MG/ML
INJECTION, EMULSION INTRAVENOUS
Status: DISCONTINUED | OUTPATIENT
Start: 2024-09-17 | End: 2024-09-17 | Stop reason: SDUPTHER

## 2024-09-17 RX ORDER — SODIUM CHLORIDE 0.9 % (FLUSH) 0.9 %
5-40 SYRINGE (ML) INJECTION EVERY 12 HOURS SCHEDULED
Status: DISCONTINUED | OUTPATIENT
Start: 2024-09-17 | End: 2024-09-17 | Stop reason: HOSPADM

## 2024-09-17 RX ORDER — SODIUM CHLORIDE 0.9 % (FLUSH) 0.9 %
5-40 SYRINGE (ML) INJECTION PRN
Status: DISCONTINUED | OUTPATIENT
Start: 2024-09-17 | End: 2024-09-17 | Stop reason: HOSPADM

## 2024-09-17 RX ORDER — LIDOCAINE HYDROCHLORIDE 20 MG/ML
INJECTION, SOLUTION INTRAVENOUS
Status: DISCONTINUED | OUTPATIENT
Start: 2024-09-17 | End: 2024-09-17 | Stop reason: SDUPTHER

## 2024-09-17 RX ORDER — 0.9 % SODIUM CHLORIDE 0.9 %
INTRAVENOUS SOLUTION INTRAVENOUS
Status: DISCONTINUED | OUTPATIENT
Start: 2024-09-17 | End: 2024-09-17 | Stop reason: SDUPTHER

## 2024-09-17 RX ORDER — GLYCOPYRROLATE 0.2 MG/ML
INJECTION INTRAMUSCULAR; INTRAVENOUS
Status: DISCONTINUED | OUTPATIENT
Start: 2024-09-17 | End: 2024-09-17 | Stop reason: SDUPTHER

## 2024-09-17 RX ORDER — SODIUM CHLORIDE 9 MG/ML
25 INJECTION, SOLUTION INTRAVENOUS PRN
Status: DISCONTINUED | OUTPATIENT
Start: 2024-09-17 | End: 2024-09-17 | Stop reason: HOSPADM

## 2024-09-17 RX ADMIN — PROPOFOL 70 MG: 10 INJECTION, EMULSION INTRAVENOUS at 11:01

## 2024-09-17 RX ADMIN — GLYCOPYRROLATE 0.2 MG: 0.2 INJECTION INTRAMUSCULAR; INTRAVENOUS at 10:58

## 2024-09-17 RX ADMIN — PROPOFOL 200 MG: 10 INJECTION, EMULSION INTRAVENOUS at 10:58

## 2024-09-17 RX ADMIN — SODIUM CHLORIDE 25 ML: 9 INJECTION, SOLUTION INTRAVENOUS at 10:53

## 2024-09-17 RX ADMIN — SODIUM CHLORIDE 200 ML: 9 INJECTION, SOLUTION INTRAVENOUS at 11:04

## 2024-09-17 RX ADMIN — LIDOCAINE HYDROCHLORIDE 40 MG: 20 INJECTION, SOLUTION INTRAVENOUS at 10:58

## 2024-09-17 ASSESSMENT — PAIN - FUNCTIONAL ASSESSMENT: PAIN_FUNCTIONAL_ASSESSMENT: 0-10

## 2024-09-17 ASSESSMENT — PAIN SCALES - GENERAL
PAINLEVEL_OUTOF10: 0

## 2024-10-14 DIAGNOSIS — E78.5 HYPERLIPIDEMIA, UNSPECIFIED HYPERLIPIDEMIA TYPE: ICD-10-CM

## 2024-10-15 DIAGNOSIS — E78.5 HYPERLIPIDEMIA, UNSPECIFIED HYPERLIPIDEMIA TYPE: ICD-10-CM

## 2024-10-15 RX ORDER — ROSUVASTATIN CALCIUM 5 MG/1
5 TABLET, COATED ORAL DAILY
Qty: 90 TABLET | Refills: 0 | Status: SHIPPED | OUTPATIENT
Start: 2024-10-15

## 2024-10-15 RX ORDER — ROSUVASTATIN CALCIUM 5 MG/1
5 TABLET, COATED ORAL DAILY
Qty: 90 TABLET | Refills: 0 | OUTPATIENT
Start: 2024-10-15

## 2024-10-28 DIAGNOSIS — Z30.41 ENCOUNTER FOR SURVEILLANCE OF CONTRACEPTIVE PILLS: ICD-10-CM

## 2024-10-30 RX ORDER — DROSPIRENONE AND ETHINYL ESTRADIOL 0.03MG-3MG
1 KIT ORAL DAILY
Qty: 28 TABLET | Refills: 0 | Status: SHIPPED | OUTPATIENT
Start: 2024-10-30

## 2024-11-04 DIAGNOSIS — Z30.41 ENCOUNTER FOR SURVEILLANCE OF CONTRACEPTIVE PILLS: ICD-10-CM

## 2024-11-04 RX ORDER — DROSPIRENONE AND ETHINYL ESTRADIOL 0.03MG-3MG
1 KIT ORAL DAILY
Qty: 28 TABLET | Refills: 0 | Status: SHIPPED | OUTPATIENT
Start: 2024-11-04

## 2024-11-04 NOTE — TELEPHONE ENCOUNTER
Patient call and [facility] will not fill 30 d/s.      Pt advises needs to start pack today.    Asking to please send to [facility] asap.     **Contacted patient to advise needs to schedule appt.- Stated she will call to schedule  Luda Younger    Last appointment: 11/16/23 Vickie  Next appointment: None- Patient contacted to schedule ASAP  Previous refill encounter(s): 10/30/24 28 ([facility]- pharmacy needs 90 d/s to fill)    Requested Prescriptions     Pending Prescriptions Disp Refills    drospirenone-ethinyl estradiol (ABBIE) 3-0.03 MG TABS 28 tablet 0     Sig: Take 1 tablet by mouth daily (Patient needs appt)     For Pharmacy Admin Tracking Only    Program: Medication Refill  CPA in place:    Recommendation Provided To:   Intervention Detail: New Rx: 1, reason: Patient Preference  Intervention Accepted By:   Gap Closed?:    Time Spent (min): 5

## 2024-11-25 DIAGNOSIS — Z30.41 ENCOUNTER FOR SURVEILLANCE OF CONTRACEPTIVE PILLS: ICD-10-CM

## 2024-11-26 RX ORDER — DROSPIRENONE AND ETHINYL ESTRADIOL 0.03MG-3MG
KIT ORAL
Qty: 84 TABLET | Refills: 1 | OUTPATIENT
Start: 2024-11-26

## 2024-12-02 DIAGNOSIS — F41.1 GENERALIZED ANXIETY DISORDER: ICD-10-CM

## 2024-12-03 RX ORDER — SERTRALINE HYDROCHLORIDE 100 MG/1
TABLET, FILM COATED ORAL
Qty: 135 TABLET | Refills: 3 | OUTPATIENT
Start: 2024-12-03

## 2024-12-30 DIAGNOSIS — F41.1 GENERALIZED ANXIETY DISORDER: ICD-10-CM

## 2024-12-30 DIAGNOSIS — E78.5 HYPERLIPIDEMIA, UNSPECIFIED HYPERLIPIDEMIA TYPE: ICD-10-CM

## 2024-12-30 DIAGNOSIS — Z30.41 ENCOUNTER FOR SURVEILLANCE OF CONTRACEPTIVE PILLS: ICD-10-CM

## 2024-12-30 RX ORDER — ROSUVASTATIN CALCIUM 5 MG/1
5 TABLET, COATED ORAL DAILY
Qty: 90 TABLET | Refills: 0 | OUTPATIENT
Start: 2024-12-30

## 2024-12-30 RX ORDER — SERTRALINE HYDROCHLORIDE 100 MG/1
TABLET, FILM COATED ORAL
Qty: 135 TABLET | Refills: 0 | OUTPATIENT
Start: 2024-12-30

## 2024-12-30 RX ORDER — DROSPIRENONE AND ETHINYL ESTRADIOL 0.03MG-3MG
1 KIT ORAL DAILY
Qty: 28 TABLET | Refills: 1 | OUTPATIENT
Start: 2024-12-30

## 2024-12-30 NOTE — TELEPHONE ENCOUNTER
Patient called requesting refill on her rosuvastatin 5 mg and sertraline 100 mg tablet    Best call back # [phone]

## 2024-12-30 NOTE — TELEPHONE ENCOUNTER
Patient called requesting refill on her rosuvastatin 5 mg and sertraline 100 mg tablet    Best call back # [phone]     Patient has scheduled OV: 3/13/25     Needs refills til appt.  Aren, Luda    Last appointment: 11/16/23 Vickie  Next appointment: 3/13/25 Vickie  Previous refill encounter(s):   Abbie 11/4/24 28  Rosuvastatin 10/15/24 90  Sertraline 9/3/24 135    Requested Prescriptions     Pending Prescriptions Disp Refills    drospirenone-ethinyl estradiol (ABBIE) 3-0.03 MG TABS [Pharmacy Med Name: ABBIE 28 TABLET] 28 tablet 1     Sig: Take 1 tablet by mouth daily    rosuvastatin (CRESTOR) 5 MG tablet 90 tablet 0     Sig: Take 1 tablet by mouth daily (Needs appt before next refill)    sertraline (ZOLOFT) 100 MG tablet 135 tablet 0     Sig: Take one and one-half tablets by mouth daily.     For Pharmacy Admin Tracking Only    Program: Medication Refill  CPA in place:    Recommendation Provided To:   Intervention Detail: New Rx: 3, reason: Patient Preference  Intervention Accepted By:   Gap Closed?:    Time Spent (min): 5

## 2024-12-30 NOTE — TELEPHONE ENCOUNTER
[facility]. We see patient scheduled her physical in March 2025. But it has already been over a year since Dr. Johnston has seen her, she will need sooner appt for refill. Virtual would be okay.

## 2024-12-31 NOTE — TELEPHONE ENCOUNTER
Patient calling again- stated has scheduled appt and needs refills of sertraline and omeprazole (forgot omeprazole bottle at home).  Thanks, Luda    Last appointment: 11/16/23 Vickie  Next appointment: VV 1/3/25 Vickie, OV 3/13/25 Vickie  Previous refill encounter(s):   Omeprazole- historical provider  Sertraline 9/3/24 135    Requested Prescriptions     Pending Prescriptions Disp Refills    omeprazole (PRILOSEC) 20 MG delayed release capsule 30 capsule 0     Sig: Take 1 capsule by mouth every morning (before breakfast)    sertraline (ZOLOFT) 100 MG tablet 135 tablet 0     Sig: Take 1.5 tablets by mouth daily     Refused Prescriptions Disp Refills    sertraline (ZOLOFT) 100 MG tablet 135 tablet 0     Refused By: MIRIAN MAYORGA     Reason for Refusal: Patient needs an appointment     For Pharmacy Admin Tracking Only    Program: Medication Refill  CPA in place:    Recommendation Provided To:   Intervention Detail: New Rx: 2, reason: Patient Preference  Intervention Accepted By:   Gap Closed?:    Time Spent (min): 5

## 2025-01-01 RX ORDER — SERTRALINE HYDROCHLORIDE 100 MG/1
150 TABLET, FILM COATED ORAL DAILY
Qty: 15 TABLET | Refills: 0 | Status: SHIPPED | OUTPATIENT
Start: 2025-01-01 | End: 2025-01-03 | Stop reason: SDUPTHER

## 2025-01-03 ENCOUNTER — TELEMEDICINE (OUTPATIENT)
Age: 30
End: 2025-01-03
Payer: COMMERCIAL

## 2025-01-03 DIAGNOSIS — E78.5 HYPERLIPIDEMIA, UNSPECIFIED HYPERLIPIDEMIA TYPE: Primary | ICD-10-CM

## 2025-01-03 DIAGNOSIS — K21.9 GASTROESOPHAGEAL REFLUX DISEASE WITHOUT ESOPHAGITIS: ICD-10-CM

## 2025-01-03 DIAGNOSIS — Z30.41 ENCOUNTER FOR SURVEILLANCE OF CONTRACEPTIVE PILLS: ICD-10-CM

## 2025-01-03 DIAGNOSIS — F42.9 OBSESSIVE-COMPULSIVE DISORDER, UNSPECIFIED TYPE: ICD-10-CM

## 2025-01-03 DIAGNOSIS — F41.1 GENERALIZED ANXIETY DISORDER: ICD-10-CM

## 2025-01-03 PROCEDURE — 99213 OFFICE O/P EST LOW 20 MIN: CPT | Performed by: INTERNAL MEDICINE

## 2025-01-03 RX ORDER — SERTRALINE HYDROCHLORIDE 100 MG/1
150 TABLET, FILM COATED ORAL DAILY
Qty: 135 TABLET | Refills: 0 | Status: SHIPPED | OUTPATIENT
Start: 2025-01-03

## 2025-01-03 RX ORDER — SERTRALINE HYDROCHLORIDE 100 MG/1
150 TABLET, FILM COATED ORAL DAILY
Qty: 90 TABLET | Refills: 0 | Status: SHIPPED | OUTPATIENT
Start: 2025-01-03 | End: 2025-01-03 | Stop reason: SDUPTHER

## 2025-01-03 RX ORDER — ROSUVASTATIN CALCIUM 5 MG/1
5 TABLET, COATED ORAL DAILY
Qty: 90 TABLET | Refills: 0 | Status: SHIPPED | OUTPATIENT
Start: 2025-01-03

## 2025-01-03 RX ORDER — DROSPIRENONE AND ETHINYL ESTRADIOL 0.03MG-3MG
1 KIT ORAL DAILY
Qty: 90 TABLET | Refills: 0 | Status: SHIPPED | OUTPATIENT
Start: 2025-01-03

## 2025-01-03 SDOH — ECONOMIC STABILITY: FOOD INSECURITY: WITHIN THE PAST 12 MONTHS, YOU WORRIED THAT YOUR FOOD WOULD RUN OUT BEFORE YOU GOT MONEY TO BUY MORE.: NEVER TRUE

## 2025-01-03 SDOH — ECONOMIC STABILITY: TRANSPORTATION INSECURITY
IN THE PAST 12 MONTHS, HAS LACK OF TRANSPORTATION KEPT YOU FROM MEETINGS, WORK, OR FROM GETTING THINGS NEEDED FOR DAILY LIVING?: NO

## 2025-01-03 SDOH — ECONOMIC STABILITY: INCOME INSECURITY: HOW HARD IS IT FOR YOU TO PAY FOR THE VERY BASICS LIKE FOOD, HOUSING, MEDICAL CARE, AND HEATING?: NOT HARD AT ALL

## 2025-01-03 SDOH — ECONOMIC STABILITY: FOOD INSECURITY: WITHIN THE PAST 12 MONTHS, THE FOOD YOU BOUGHT JUST DIDN'T LAST AND YOU DIDN'T HAVE MONEY TO GET MORE.: NEVER TRUE

## 2025-01-03 ASSESSMENT — ENCOUNTER SYMPTOMS
WHEEZING: 0
SORE THROAT: 0
COLOR CHANGE: 0
SHORTNESS OF BREATH: 0
VOMITING: 0
ABDOMINAL PAIN: 0
FACIAL SWELLING: 0
NAUSEA: 0
COUGH: 0
RHINORRHEA: 0
CHEST TIGHTNESS: 0

## 2025-01-03 NOTE — PROGRESS NOTES
Bri Conroy is a 29 y.o. female who was seen by synchronous (real-time) audio-video technology on 1/3/2025.      Consent:  Services were provided through a video synchronous discussion virtually to substitute for in-person appointment.   She and/or her healthcare decision maker is aware that this patient-initiated Telehealth encounter is a billable service, with coverage as determined by her insurance carrier. She is aware that she may receive a bill and has provided verbal consent to proceed: Yes    I was at home while conducting this encounter.    Subjective:   Bri Conroy was seen for Medication Refill (For anxiety disorder)    Patient is here on this visit for follow-up chronic medical conditions including dyslipidemia, acid reflux, generalized anxiety disorder, contraception management and medication refills/evaluation.   Cardiovascular review  Patient has had persistently elevated LDL cholesterol for the last 2 years, has tried diet changes exercise and weight loss with minimal change.  Has been taking rosuvastatin 5 mg daily with no side effects.  Will check labs at her next office visit.  Psych review  Patient has a history of generalized anxiety disorder and obsessive-compulsive disorder which has been well-controlled on the sertraline 150 mg daily, has been taking the medication daily with no side effects.  General review  Patient has been on birth control pills for over 3 years, has had no side effects to the medication and takes it daily as prescribed.  Has no breakthrough bleeding blood pressure changes.      Prior to Admission medications    Medication Sig Start Date End Date Taking? Authorizing Provider   rosuvastatin (CRESTOR) 5 MG tablet Take 1 tablet by mouth daily (Needs appt before next refill) 1/3/25  Yes Ella Johnston MD   drospirenone-ethinyl estradiol (ABBIE) 3-0.03 MG TABS Take 1 tablet by mouth daily (Patient needs appt) 1/3/25  Yes Ella Johnston MD   omeprazole (PRILOSEC)

## 2025-01-25 DIAGNOSIS — Z30.41 ENCOUNTER FOR SURVEILLANCE OF CONTRACEPTIVE PILLS: ICD-10-CM

## 2025-01-28 RX ORDER — DROSPIRENONE AND ETHINYL ESTRADIOL 0.03MG-3MG
1 KIT ORAL DAILY
Qty: 3 PACKET | Refills: 2 | Status: SHIPPED | OUTPATIENT
Start: 2025-01-28

## 2025-02-03 DIAGNOSIS — Z30.41 ENCOUNTER FOR SURVEILLANCE OF CONTRACEPTIVE PILLS: ICD-10-CM

## 2025-02-04 RX ORDER — DROSPIRENONE AND ETHINYL ESTRADIOL 0.03MG-3MG
1 KIT ORAL DAILY
Qty: 84 TABLET | Refills: 0 | Status: SHIPPED | OUTPATIENT
Start: 2025-02-04

## 2025-03-13 ENCOUNTER — HOSPITAL ENCOUNTER (OUTPATIENT)
Facility: HOSPITAL | Age: 30
Setting detail: SPECIMEN
Discharge: HOME | End: 2025-03-16
Payer: COMMERCIAL

## 2025-03-13 ENCOUNTER — OFFICE VISIT (OUTPATIENT)
Age: 30
End: 2025-03-13

## 2025-03-13 VITALS
SYSTOLIC BLOOD PRESSURE: 110 MMHG | TEMPERATURE: 97.5 F | WEIGHT: 150 LBS | OXYGEN SATURATION: 98 % | DIASTOLIC BLOOD PRESSURE: 77 MMHG | HEART RATE: 66 BPM | BODY MASS INDEX: 22.22 KG/M2 | HEIGHT: 69 IN

## 2025-03-13 DIAGNOSIS — E78.5 HYPERLIPIDEMIA, UNSPECIFIED HYPERLIPIDEMIA TYPE: ICD-10-CM

## 2025-03-13 DIAGNOSIS — Z01.419 PAP SMEAR, AS PART OF ROUTINE GYNECOLOGICAL EXAMINATION: ICD-10-CM

## 2025-03-13 DIAGNOSIS — Z11.59 NEED FOR HEPATITIS C SCREENING TEST: ICD-10-CM

## 2025-03-13 DIAGNOSIS — Z30.41 ENCOUNTER FOR SURVEILLANCE OF CONTRACEPTIVE PILLS: ICD-10-CM

## 2025-03-13 DIAGNOSIS — Z00.00 ADULT GENERAL MEDICAL EXAMINATION: Primary | ICD-10-CM

## 2025-03-13 DIAGNOSIS — Z13.220 SCREENING FOR LIPID DISORDERS: ICD-10-CM

## 2025-03-13 DIAGNOSIS — F41.1 GENERALIZED ANXIETY DISORDER: ICD-10-CM

## 2025-03-13 PROCEDURE — 88175 CYTOPATH C/V AUTO FLUID REDO: CPT

## 2025-03-13 PROCEDURE — 87624 HPV HI-RISK TYP POOLED RSLT: CPT

## 2025-03-13 RX ORDER — ROSUVASTATIN CALCIUM 5 MG/1
5 TABLET, COATED ORAL DAILY
Qty: 90 TABLET | Refills: 3 | Status: SHIPPED | OUTPATIENT
Start: 2025-03-13

## 2025-03-13 RX ORDER — DROSPIRENONE AND ETHINYL ESTRADIOL 0.03MG-3MG
1 KIT ORAL DAILY
Qty: 84 TABLET | Refills: 3 | Status: SHIPPED | OUTPATIENT
Start: 2025-03-13

## 2025-03-13 RX ORDER — SERTRALINE HYDROCHLORIDE 100 MG/1
150 TABLET, FILM COATED ORAL DAILY
Qty: 135 TABLET | Refills: 3 | Status: SHIPPED | OUTPATIENT
Start: 2025-03-13

## 2025-03-13 SDOH — ECONOMIC STABILITY: FOOD INSECURITY: WITHIN THE PAST 12 MONTHS, THE FOOD YOU BOUGHT JUST DIDN'T LAST AND YOU DIDN'T HAVE MONEY TO GET MORE.: NEVER TRUE

## 2025-03-13 SDOH — ECONOMIC STABILITY: FOOD INSECURITY: WITHIN THE PAST 12 MONTHS, YOU WORRIED THAT YOUR FOOD WOULD RUN OUT BEFORE YOU GOT MONEY TO BUY MORE.: NEVER TRUE

## 2025-03-13 ASSESSMENT — PATIENT HEALTH QUESTIONNAIRE - PHQ9
SUM OF ALL RESPONSES TO PHQ QUESTIONS 1-9: 0
1. LITTLE INTEREST OR PLEASURE IN DOING THINGS: NOT AT ALL
SUM OF ALL RESPONSES TO PHQ QUESTIONS 1-9: 0
2. FEELING DOWN, DEPRESSED OR HOPELESS: NOT AT ALL

## 2025-03-13 ASSESSMENT — ENCOUNTER SYMPTOMS
VOMITING: 0
COLOR CHANGE: 0
EYE ITCHING: 0
SORE THROAT: 0
RHINORRHEA: 0
CHEST TIGHTNESS: 0
CONSTIPATION: 0
COUGH: 0
SHORTNESS OF BREATH: 0
NAUSEA: 0
WHEEZING: 0
DIARRHEA: 0
ABDOMINAL PAIN: 0

## 2025-03-13 NOTE — PROGRESS NOTES
Chief Complaint   Patient presents with    Annual Exam     Patient is fasting.         1. \"Have you been to the ER or a urgent care clinic since your last visit?  Hospitalized since your last visit?\"  no      2. \"Have you seen or consulted any other health care providers outside of the [facility] since your last visit?\"    no           Click Here for Release of Records Request       Health Maintenance Due   Topic Date Due    Varicella vaccine (1 of 2 - 13+ 2-dose series) Never done    HIV screen  Never done    Hepatitis C screen  Never done    Hepatitis B vaccine (1 of 3 - 19+ 3-dose series) Never done    DTaP/Tdap/Td vaccine (1 - Tdap) Never done    Pap smear  Never done    COVID-19 Vaccine (2 - 2024-25 season) 09/01/2024    Lipids  10/19/2024    Depression Screen  10/19/2024           3/13/2025     1:36 PM   PHQ-9    Little interest or pleasure in doing things 0   Feeling down, depressed, or hopeless 0   PHQ-2 Score 0   PHQ-9 Total Score 0           Financial Resource Strain: Low Risk  (10/19/2023)    Overall Financial Resource Strain (CARDIA)     Difficulty of Paying Living Expenses: Not hard at all      Food Insecurity: No Food Insecurity (3/13/2025)    Hunger Vital Sign     Worried About Running Out of Food in the Last Year: Never true     Ran Out of Food in the Last Year: Never true

## 2025-03-13 NOTE — PROGRESS NOTES
Subjective:       Bri Conroy is a 29 y.o. female and is here for a comprehensive physical exam and routine labs  General Review  Patient has been on birth control pills for over 3 years, has had no side effects to the medication and takes it daily as prescribed.  Has no breakthrough bleeding blood pressure changes.  Cardiovascular review  Patient has had persistently elevated LDL cholesterol for the last 2 years, has tried diet changes exercise and weight loss with minimal change.  Has been taking rosuvastatin 5 mg daily with no side effects.  Will check labs at her next office visit.  Psych review  Patient has a history of generalized anxiety disorder and obsessive-compulsive disorder which has been well-controlled on the sertraline 150 mg daily, has been taking the medication daily with no side effect  Preventative Review  Diet-regular diet, eats fast foods occasionally, drinks plenty of water.  Drinks alcohol occasionally.  No sodas or juices.  Eats plenty of proteins.  Exercise-no scheduled exercise.  Sleep-sleeps 6 to 7 hours, restful sleep.  Stress-low to medium.  Immunizations-up to date  Colon Cancer Screening-not applicable  Breast Cancer Screening-not applicable  Skin Cancer Screening-not applicable   History  LMP: Patient's last menstrual period was 2025 (approximate).  Cycles are regular, not associated with cramps clots or increased flow.  Last pap date: , normal.  Abnormal pap? no  : 0  Para: 0    Past Medical History:   Diagnosis Date    Encounter for surveillance of contraceptive pills 10/19/2023    Generalized anxiety disorder 10/19/2023    H/O supraventricular tachycardia 10/19/2023    Obsessive-compulsive disorder 10/19/2023     Patient Active Problem List    Diagnosis Date Noted    Obsessive-compulsive disorder 10/19/2023    Generalized anxiety disorder 10/19/2023    Encounter for surveillance of contraceptive pills 10/19/2023    H/O supraventricular tachycardia 10/19/2023

## 2025-03-14 LAB
ALBUMIN SERPL-MCNC: 3.8 G/DL (ref 3.5–5)
ALBUMIN/GLOB SERPL: 1 (ref 1.1–2.2)
ALP SERPL-CCNC: 77 U/L (ref 45–117)
ALT SERPL-CCNC: 15 U/L (ref 12–78)
ANION GAP SERPL CALC-SCNC: 7 MMOL/L (ref 2–12)
APPEARANCE UR: CLEAR
AST SERPL-CCNC: 21 U/L (ref 15–37)
BACTERIA URNS QL MICRO: ABNORMAL /HPF
BASOPHILS # BLD: 0.06 K/UL (ref 0–0.1)
BASOPHILS NFR BLD: 0.8 % (ref 0–1)
BILIRUB SERPL-MCNC: 0.2 MG/DL (ref 0.2–1)
BILIRUB UR QL: NEGATIVE
BUN SERPL-MCNC: 10 MG/DL (ref 6–20)
BUN/CREAT SERPL: 14 (ref 12–20)
CALCIUM SERPL-MCNC: 9.4 MG/DL (ref 8.5–10.1)
CHLORIDE SERPL-SCNC: 103 MMOL/L (ref 97–108)
CHOLEST SERPL-MCNC: 257 MG/DL
CO2 SERPL-SCNC: 26 MMOL/L (ref 21–32)
COLOR UR: ABNORMAL
CREAT SERPL-MCNC: 0.71 MG/DL (ref 0.55–1.02)
DIFFERENTIAL METHOD BLD: ABNORMAL
EOSINOPHIL # BLD: 0.15 K/UL (ref 0–0.4)
EOSINOPHIL NFR BLD: 2.1 % (ref 0–7)
EPITH CASTS URNS QL MICRO: ABNORMAL /LPF
ERYTHROCYTE [DISTWIDTH] IN BLOOD BY AUTOMATED COUNT: 14.8 % (ref 11.5–14.5)
GLOBULIN SER CALC-MCNC: 3.9 G/DL (ref 2–4)
GLUCOSE SERPL-MCNC: 75 MG/DL (ref 65–100)
GLUCOSE UR STRIP.AUTO-MCNC: NEGATIVE MG/DL
HCT VFR BLD AUTO: 38.4 % (ref 35–47)
HDLC SERPL-MCNC: 79 MG/DL
HDLC SERPL: 3.3 (ref 0–5)
HGB BLD-MCNC: 12 G/DL (ref 11.5–16)
HGB UR QL STRIP: NEGATIVE
HYALINE CASTS URNS QL MICRO: ABNORMAL /LPF (ref 0–5)
IMM GRANULOCYTES # BLD AUTO: 0.02 K/UL (ref 0–0.04)
IMM GRANULOCYTES NFR BLD AUTO: 0.3 % (ref 0–0.5)
KETONES UR QL STRIP.AUTO: NEGATIVE MG/DL
LDLC SERPL CALC-MCNC: 151.4 MG/DL (ref 0–100)
LEUKOCYTE ESTERASE UR QL STRIP.AUTO: NEGATIVE
LYMPHOCYTES # BLD: 2.57 K/UL (ref 0.8–3.5)
LYMPHOCYTES NFR BLD: 35.5 % (ref 12–49)
MCH RBC QN AUTO: 24.8 PG (ref 26–34)
MCHC RBC AUTO-ENTMCNC: 31.3 G/DL (ref 30–36.5)
MCV RBC AUTO: 79.5 FL (ref 80–99)
MONOCYTES # BLD: 0.72 K/UL (ref 0–1)
MONOCYTES NFR BLD: 9.9 % (ref 5–13)
NEUTS SEG # BLD: 3.72 K/UL (ref 1.8–8)
NEUTS SEG NFR BLD: 51.4 % (ref 32–75)
NITRITE UR QL STRIP.AUTO: NEGATIVE
NRBC # BLD: 0 K/UL (ref 0–0.01)
NRBC BLD-RTO: 0 PER 100 WBC
PH UR STRIP: 6 (ref 5–8)
PLATELET # BLD AUTO: 308 K/UL (ref 150–400)
PMV BLD AUTO: 11.6 FL (ref 8.9–12.9)
POTASSIUM SERPL-SCNC: 3.9 MMOL/L (ref 3.5–5.1)
PROT SERPL-MCNC: 7.7 G/DL (ref 6.4–8.2)
PROT UR STRIP-MCNC: NEGATIVE MG/DL
RBC # BLD AUTO: 4.83 M/UL (ref 3.8–5.2)
RBC #/AREA URNS HPF: ABNORMAL /HPF (ref 0–5)
SODIUM SERPL-SCNC: 136 MMOL/L (ref 136–145)
SP GR UR REFRACTOMETRY: 1.01 (ref 1–1.03)
T4 FREE SERPL-MCNC: 1 NG/DL (ref 0.8–1.5)
TRIGL SERPL-MCNC: 133 MG/DL
TSH SERPL DL<=0.05 MIU/L-ACNC: 3.21 UIU/ML (ref 0.36–3.74)
URINE CULTURE IF INDICATED: ABNORMAL
UROBILINOGEN UR QL STRIP.AUTO: 0.2 EU/DL (ref 0.2–1)
VLDLC SERPL CALC-MCNC: 26.6 MG/DL
WBC # BLD AUTO: 7.2 K/UL (ref 3.6–11)
WBC URNS QL MICRO: ABNORMAL /HPF (ref 0–4)

## 2025-03-15 LAB
HCV AB SERPL QL IA: NORMAL
HCV IGG SERPL QL IA: NON REACTIVE S/CO RATIO

## 2025-03-17 ENCOUNTER — RESULTS FOLLOW-UP (OUTPATIENT)
Age: 30
End: 2025-03-17

## 2025-03-17 LAB — HPV I/H RISK 1 DNA CVX QL PROBE+SIG AMP: NEGATIVE

## 2025-03-18 ENCOUNTER — RESULTS FOLLOW-UP (OUTPATIENT)
Facility: HOSPITAL | Age: 30
End: 2025-03-18

## 2025-05-06 DIAGNOSIS — E78.5 HYPERLIPIDEMIA, UNSPECIFIED HYPERLIPIDEMIA TYPE: ICD-10-CM

## 2025-05-07 RX ORDER — ROSUVASTATIN CALCIUM 5 MG/1
5 TABLET, COATED ORAL DAILY
Qty: 90 TABLET | Refills: 2 | Status: SHIPPED | OUTPATIENT
Start: 2025-05-07

## 2025-05-29 DIAGNOSIS — B37.31 CANDIDAL VAGINITIS: Primary | ICD-10-CM

## 2025-05-29 RX ORDER — FLUCONAZOLE 150 MG/1
150 TABLET ORAL
Qty: 2 TABLET | Refills: 0 | Status: SHIPPED | OUTPATIENT
Start: 2025-05-29 | End: 2025-06-04

## (undated) DEVICE — BLUNTFILL WITH FILTER: Brand: MONOJECT

## (undated) DEVICE — IV STRT KT 3282] LSL INDUSTRIES INC]

## (undated) DEVICE — ELECTRODE,RADIOTRANSLUCENT,FOAM,3PK: Brand: MEDLINE

## (undated) DEVICE — SET ADMIN 16ML TBNG L100IN 2 Y INJ SITE IV PIGGY BK DISP (ORDER IN MULIPLES OF 48)

## (undated) DEVICE — KIT COLON W/ 1.1OZ LUB AND 2 END

## (undated) DEVICE — CATHETER IV 22GA L1IN OD0.8382-0.9144MM ID0.6096-0.6858MM 382523

## (undated) DEVICE — 1200 GUARD II KIT W/5MM TUBE W/O VAC TUBE: Brand: GUARDIAN

## (undated) DEVICE — SYRINGE MED 5ML STD CLR PLAS LUERLOCK TIP N CTRL DISP

## (undated) DEVICE — CANNULA CUSH AD W/ 14FT TBG

## (undated) DEVICE — SYRINGE MEDICAL 3ML CLEAR PLASTIC STANDARD NON CONTROL LUERLOCK TIP DISPOSABLE

## (undated) DEVICE — SOLIDIFIER FLD 2OZ 1500CC N DISINF IN BTL DISP SAFESORB

## (undated) DEVICE — ORCAPOD BUTTONS

## (undated) DEVICE — SINGLE USE AIR/WATER, SUCTION AND BIOPSY VALVES SET: Brand: ORCAPOD™

## (undated) DEVICE — BITEBLOCK ENDOSCP 60FR MAXI WHT POLYETH STURDY W/ VELC WVN

## (undated) DEVICE — BLUNTFILL: Brand: MONOJECT